# Patient Record
Sex: FEMALE | Race: WHITE | NOT HISPANIC OR LATINO
[De-identification: names, ages, dates, MRNs, and addresses within clinical notes are randomized per-mention and may not be internally consistent; named-entity substitution may affect disease eponyms.]

---
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
## 2019-02-05 ENCOUNTER — APPOINTMENT (OUTPATIENT)
Dept: FAMILY MEDICINE | Facility: CLINIC | Age: 19
End: 2019-02-05

## 2019-06-24 ENCOUNTER — APPOINTMENT (OUTPATIENT)
Dept: FAMILY MEDICINE | Facility: CLINIC | Age: 19
End: 2019-06-24
Payer: COMMERCIAL

## 2019-06-24 VITALS
OXYGEN SATURATION: 96 % | SYSTOLIC BLOOD PRESSURE: 121 MMHG | BODY MASS INDEX: 21.33 KG/M2 | HEART RATE: 68 BPM | HEIGHT: 65 IN | DIASTOLIC BLOOD PRESSURE: 81 MMHG | TEMPERATURE: 97.9 F | WEIGHT: 128 LBS

## 2019-06-24 DIAGNOSIS — H60.509 UNSPECIFIED ACUTE NONINFECTIVE OTITIS EXTERNA, UNSPECIFIED EAR: ICD-10-CM

## 2019-06-24 PROCEDURE — 99213 OFFICE O/P EST LOW 20 MIN: CPT

## 2019-06-24 NOTE — HISTORY OF PRESENT ILLNESS
[FreeTextEntry8] : 20 yo female here for complaint of ear itchiness in both ears. Had been using cutips and experienced a lot of liquidy wax coming out. No ear pain. Started about 2 months ago and is getting worse. No runny nose, itchy eyes, itchy throat. Never had anything like this before. No recent swimming prior. No fever or chills. No cold symptoms.

## 2019-06-24 NOTE — PHYSICAL EXAM
[No Acute Distress] : no acute distress [Normal Sclera/Conjunctiva] : normal sclera/conjunctiva [Normal Outer Ear/Nose] : the outer ears and nose were normal in appearance [Supple] : supple [No Respiratory Distress] : no respiratory distress  [Normal Rate] : normal rate  [Regular Rhythm] : with a regular rhythm [Clear to Auscultation] : lungs were clear to auscultation bilaterally [No Edema] : there was no peripheral edema [No Joint Swelling] : no joint swelling [Normal Gait] : normal gait [Normal Insight/Judgement] : insight and judgment were intact [No Rash] : no rash [de-identified] : otitis externa b/l

## 2020-01-17 ENCOUNTER — APPOINTMENT (OUTPATIENT)
Dept: INTERNAL MEDICINE | Facility: CLINIC | Age: 20
End: 2020-01-17
Payer: COMMERCIAL

## 2020-01-17 VITALS
TEMPERATURE: 98.1 F | OXYGEN SATURATION: 97 % | WEIGHT: 130 LBS | SYSTOLIC BLOOD PRESSURE: 118 MMHG | BODY MASS INDEX: 21.66 KG/M2 | HEIGHT: 65 IN | HEART RATE: 65 BPM | DIASTOLIC BLOOD PRESSURE: 71 MMHG

## 2020-01-17 PROCEDURE — 99213 OFFICE O/P EST LOW 20 MIN: CPT

## 2020-01-17 RX ORDER — CIPROFLOXACIN AND DEXAMETHASONE 3; 1 MG/ML; MG/ML
0.3-0.1 SUSPENSION/ DROPS AURICULAR (OTIC) TWICE DAILY
Qty: 1 | Refills: 0 | Status: DISCONTINUED | COMMUNITY
Start: 2019-06-24 | End: 2020-01-17

## 2020-01-17 RX ORDER — FLUTICASONE PROPIONATE 50 UG/1
50 SPRAY, METERED NASAL TWICE DAILY
Qty: 1 | Refills: 0 | Status: DISCONTINUED | COMMUNITY
Start: 2017-12-20 | End: 2020-01-17

## 2020-01-29 ENCOUNTER — APPOINTMENT (OUTPATIENT)
Dept: OTOLARYNGOLOGY | Facility: CLINIC | Age: 20
End: 2020-01-29
Payer: COMMERCIAL

## 2020-01-29 VITALS
DIASTOLIC BLOOD PRESSURE: 81 MMHG | SYSTOLIC BLOOD PRESSURE: 128 MMHG | HEART RATE: 88 BPM | WEIGHT: 130 LBS | OXYGEN SATURATION: 98 % | HEIGHT: 65 IN | BODY MASS INDEX: 21.66 KG/M2

## 2020-01-29 PROCEDURE — 99203 OFFICE O/P NEW LOW 30 MIN: CPT

## 2020-01-29 NOTE — ASSESSMENT
[FreeTextEntry1] : 19F with persistent left ear itching and drainage, seen and treated with ear drops previously, with recurrence of symptoms; likely otitis externa.  Some evidence of irritatio on left today without overt infection.  I would recommend 7 days of ciprodex bid.  Otitis externa informational sheet given to patient.  She should follow up should symptoms worsen or fail to improve.\par \par Plan:\par - Rx ciprodex\par - water precautions for the ear\par - OE sheet given to patient\par - follow up as needed

## 2020-01-29 NOTE — PHYSICAL EXAM
[Normal] : mucosa is normal [de-identified] : left EAC with mild erythema and edema, no overt infection or purulence

## 2020-01-29 NOTE — REVIEW OF SYSTEMS
[As Noted in HPI] : as noted in HPI [Patient Intake Form Reviewed] : Patient intake form was reviewed [Negative] : Constitutional

## 2020-01-29 NOTE — HISTORY OF PRESENT ILLNESS
[de-identified] : 19F who presents with left ear itching and drainage. Patient reports she has seen her PCP for left ear itching for which she has been treated with ear drops (unsure which type) with some success, but reports recurrence of the symptoms. She denies any hearing changes, otalgia, tinnitus or vertigo.  She does have intermittent otorrhea, once per week.  No recent swimming or water exposure.  She denies any ENT issues otherwise.

## 2020-01-31 RX ORDER — CIPROFLOXACIN AND DEXAMETHASONE 3; 1 MG/ML; MG/ML
0.3-0.1 SUSPENSION/ DROPS AURICULAR (OTIC)
Qty: 1 | Refills: 0 | Status: DISCONTINUED | COMMUNITY
Start: 2020-01-29 | End: 2020-01-31

## 2020-07-09 ENCOUNTER — APPOINTMENT (OUTPATIENT)
Dept: FAMILY MEDICINE | Facility: CLINIC | Age: 20
End: 2020-07-09
Payer: COMMERCIAL

## 2020-07-09 VITALS
BODY MASS INDEX: 21.49 KG/M2 | HEIGHT: 65 IN | OXYGEN SATURATION: 100 % | WEIGHT: 129 LBS | TEMPERATURE: 98.5 F | DIASTOLIC BLOOD PRESSURE: 89 MMHG | SYSTOLIC BLOOD PRESSURE: 124 MMHG | RESPIRATION RATE: 18 BRPM | HEART RATE: 97 BPM

## 2020-07-09 DIAGNOSIS — Z11.59 ENCOUNTER FOR SCREENING FOR OTHER VIRAL DISEASES: ICD-10-CM

## 2020-07-09 DIAGNOSIS — H60.90 UNSPECIFIED OTITIS EXTERNA, UNSPECIFIED EAR: ICD-10-CM

## 2020-07-09 LAB
BASOPHILS # BLD AUTO: 0.04 K/UL
BASOPHILS NFR BLD AUTO: 0.7 %
EOSINOPHIL # BLD AUTO: 0.06 K/UL
EOSINOPHIL NFR BLD AUTO: 1 %
ESTIMATED AVERAGE GLUCOSE: 94 MG/DL
HBA1C MFR BLD HPLC: 4.9 %
HCT VFR BLD CALC: 43.9 %
HGB BLD-MCNC: 14.9 G/DL
IMM GRANULOCYTES NFR BLD AUTO: 0.3 %
LYMPHOCYTES # BLD AUTO: 1.46 K/UL
LYMPHOCYTES NFR BLD AUTO: 24.1 %
MAN DIFF?: NORMAL
MCHC RBC-ENTMCNC: 31.2 PG
MCHC RBC-ENTMCNC: 33.9 GM/DL
MCV RBC AUTO: 91.8 FL
MONOCYTES # BLD AUTO: 0.48 K/UL
MONOCYTES NFR BLD AUTO: 7.9 %
NEUTROPHILS # BLD AUTO: 4 K/UL
NEUTROPHILS NFR BLD AUTO: 66 %
PLATELET # BLD AUTO: 218 K/UL
RBC # BLD: 4.78 M/UL
RBC # FLD: 12.1 %
WBC # FLD AUTO: 6.06 K/UL

## 2020-07-09 PROCEDURE — 36415 COLL VENOUS BLD VENIPUNCTURE: CPT

## 2020-07-09 PROCEDURE — 99395 PREV VISIT EST AGE 18-39: CPT | Mod: 25

## 2020-07-09 RX ORDER — FLUOXETINE HYDROCHLORIDE 20 MG/1
20 CAPSULE ORAL
Refills: 0 | Status: DISCONTINUED | COMMUNITY
Start: 2020-01-17 | End: 2020-07-09

## 2020-07-09 NOTE — HEALTH RISK ASSESSMENT
[Good] : ~his/her~  mood as  good [0] : 2) Feeling down, depressed, or hopeless: Not at all (0) [HIV test declined] : HIV test declined [Hepatitis C test declined] : Hepatitis C test declined [With Significant Other] : lives with significant other [Unemployed] : unemployed [Significant Other] : lives with significant other

## 2020-07-09 NOTE — HISTORY OF PRESENT ILLNESS
[de-identified] : 21 yo female here for CPE.\par \par Still with recurrent ear issues. Ciprodex helps but then symptoms come back.\par \par Has cyst on the back of her head which has grown since she first noticed it a few years ago. At times painful.

## 2020-07-09 NOTE — PHYSICAL EXAM
[No Acute Distress] : no acute distress [Well Nourished] : well nourished [Well Developed] : well developed [Well-Appearing] : well-appearing [Normal Sclera/Conjunctiva] : normal sclera/conjunctiva [PERRL] : pupils equal round and reactive to light [EOMI] : extraocular movements intact [Normal Outer Ear/Nose] : the outer ears and nose were normal in appearance [Normal Oropharynx] : the oropharynx was normal [No JVD] : no jugular venous distention [Supple] : supple [No Lymphadenopathy] : no lymphadenopathy [Thyroid Normal, No Nodules] : the thyroid was normal and there were no nodules present [No Respiratory Distress] : no respiratory distress  [No Accessory Muscle Use] : no accessory muscle use [Clear to Auscultation] : lungs were clear to auscultation bilaterally [Normal Rate] : normal rate  [Regular Rhythm] : with a regular rhythm [No Murmur] : no murmur heard [Normal S1, S2] : normal S1 and S2 [No Carotid Bruits] : no carotid bruits [No Abdominal Bruit] : a ~M bruit was not heard ~T in the abdomen [No Varicosities] : no varicosities [Pedal Pulses Present] : the pedal pulses are present [No Edema] : there was no peripheral edema [No Palpable Aorta] : no palpable aorta [Soft] : abdomen soft [No Extremity Clubbing/Cyanosis] : no extremity clubbing/cyanosis [No Masses] : no abdominal mass palpated [Non Tender] : non-tender [Non-distended] : non-distended [No HSM] : no HSM [Normal Bowel Sounds] : normal bowel sounds [Normal Anterior Cervical Nodes] : no anterior cervical lymphadenopathy [Normal Posterior Cervical Nodes] : no posterior cervical lymphadenopathy [No CVA Tenderness] : no CVA  tenderness [No Joint Swelling] : no joint swelling [No Spinal Tenderness] : no spinal tenderness [Grossly Normal Strength/Tone] : grossly normal strength/tone [No Rash] : no rash [Normal Gait] : normal gait [Coordination Grossly Intact] : coordination grossly intact [No Focal Deficits] : no focal deficits [Normal Affect] : the affect was normal [Normal Insight/Judgement] : insight and judgment were intact [de-identified] : otitis externa [de-identified] : 1 cm encapsulated cyst on right occiput

## 2020-07-13 DIAGNOSIS — R77.0 ABNORMALITY OF ALBUMIN: ICD-10-CM

## 2020-07-13 LAB
25(OH)D3 SERPL-MCNC: 40 NG/ML
ALBUMIN SERPL ELPH-MCNC: 5.3 G/DL
ALP BLD-CCNC: 65 U/L
ALT SERPL-CCNC: 13 U/L
ANION GAP SERPL CALC-SCNC: 15 MMOL/L
APPEARANCE: CLEAR
AST SERPL-CCNC: 19 U/L
BACTERIA: NEGATIVE
BILIRUB SERPL-MCNC: 0.6 MG/DL
BILIRUBIN URINE: NEGATIVE
BLOOD URINE: NEGATIVE
BUN SERPL-MCNC: 11 MG/DL
CALCIUM SERPL-MCNC: 10 MG/DL
CHLORIDE SERPL-SCNC: 100 MMOL/L
CHOLEST SERPL-MCNC: 208 MG/DL
CHOLEST/HDLC SERPL: 2.9 RATIO
CO2 SERPL-SCNC: 25 MMOL/L
COLOR: COLORLESS
CREAT SERPL-MCNC: 0.79 MG/DL
FOLATE SERPL-MCNC: >20 NG/ML
GLUCOSE QUALITATIVE U: NEGATIVE
GLUCOSE SERPL-MCNC: 90 MG/DL
HDLC SERPL-MCNC: 73 MG/DL
HYALINE CASTS: 0 /LPF
KETONES URINE: NEGATIVE
LDLC SERPL CALC-MCNC: 124 MG/DL
LEUKOCYTE ESTERASE URINE: NEGATIVE
MICROSCOPIC-UA: NORMAL
NITRITE URINE: NEGATIVE
PH URINE: 6
POTASSIUM SERPL-SCNC: 4.8 MMOL/L
PROT SERPL-MCNC: 7.7 G/DL
PROTEIN URINE: NEGATIVE
RED BLOOD CELLS URINE: 0 /HPF
SARS-COV-2 IGG SERPL IA-ACNC: 0.08 INDEX
SARS-COV-2 IGG SERPL QL IA: NEGATIVE
SODIUM SERPL-SCNC: 141 MMOL/L
SPECIFIC GRAVITY URINE: 1.01
SQUAMOUS EPITHELIAL CELLS: 2 /HPF
T4 FREE SERPL-MCNC: 1.3 NG/DL
TRIGL SERPL-MCNC: 59 MG/DL
TSH SERPL-ACNC: 1.74 UIU/ML
UROBILINOGEN URINE: NORMAL
VIT B12 SERPL-MCNC: 595 PG/ML
WHITE BLOOD CELLS URINE: 1 /HPF

## 2021-06-29 ENCOUNTER — NON-APPOINTMENT (OUTPATIENT)
Age: 21
End: 2021-06-29

## 2021-07-13 ENCOUNTER — APPOINTMENT (OUTPATIENT)
Dept: FAMILY MEDICINE | Facility: CLINIC | Age: 21
End: 2021-07-13
Payer: COMMERCIAL

## 2021-07-13 VITALS
HEART RATE: 89 BPM | OXYGEN SATURATION: 100 % | HEIGHT: 65 IN | BODY MASS INDEX: 20.99 KG/M2 | DIASTOLIC BLOOD PRESSURE: 89 MMHG | TEMPERATURE: 97.6 F | WEIGHT: 126 LBS | SYSTOLIC BLOOD PRESSURE: 133 MMHG

## 2021-07-13 PROCEDURE — 99213 OFFICE O/P EST LOW 20 MIN: CPT | Mod: 25

## 2021-07-13 PROCEDURE — 36415 COLL VENOUS BLD VENIPUNCTURE: CPT

## 2021-07-13 PROCEDURE — 99395 PREV VISIT EST AGE 18-39: CPT | Mod: 25

## 2021-07-13 PROCEDURE — 99072 ADDL SUPL MATRL&STAF TM PHE: CPT

## 2021-07-13 NOTE — HEALTH RISK ASSESSMENT
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

## 2021-07-13 NOTE — PHYSICAL EXAM
[Well Nourished] : well nourished [Well Developed] : well developed [Well-Appearing] : well-appearing [Normal Sclera/Conjunctiva] : normal sclera/conjunctiva [PERRL] : pupils equal round and reactive to light [EOMI] : extraocular movements intact [Normal Outer Ear/Nose] : the outer ears and nose were normal in appearance [Normal Oropharynx] : the oropharynx was normal [Normal TMs] : both tympanic membranes were normal [No JVD] : no jugular venous distention [No Lymphadenopathy] : no lymphadenopathy [Supple] : supple [Thyroid Normal, No Nodules] : the thyroid was normal and there were no nodules present [No Respiratory Distress] : no respiratory distress  [No Accessory Muscle Use] : no accessory muscle use [Clear to Auscultation] : lungs were clear to auscultation bilaterally [Normal Rate] : normal rate  [Regular Rhythm] : with a regular rhythm [Normal S1, S2] : normal S1 and S2 [No Murmur] : no murmur heard [No Carotid Bruits] : no carotid bruits [No Abdominal Bruit] : a ~M bruit was not heard ~T in the abdomen [No Varicosities] : no varicosities [Pedal Pulses Present] : the pedal pulses are present [No Edema] : there was no peripheral edema [No Palpable Aorta] : no palpable aorta [No Extremity Clubbing/Cyanosis] : no extremity clubbing/cyanosis [Soft] : abdomen soft [Non Tender] : non-tender [Non-distended] : non-distended [No Masses] : no abdominal mass palpated [No HSM] : no HSM [Normal Bowel Sounds] : normal bowel sounds [Normal Posterior Cervical Nodes] : no posterior cervical lymphadenopathy [Normal Anterior Cervical Nodes] : no anterior cervical lymphadenopathy [No CVA Tenderness] : no CVA  tenderness [No Spinal Tenderness] : no spinal tenderness [No Joint Swelling] : no joint swelling [Grossly Normal Strength/Tone] : grossly normal strength/tone [No Rash] : no rash [Coordination Grossly Intact] : coordination grossly intact [No Focal Deficits] : no focal deficits [Normal Gait] : normal gait [Normal Affect] : the affect was normal [Normal Insight/Judgement] : insight and judgment were intact [de-identified] : 1 cm encapsulated cyst on right occiput

## 2021-07-13 NOTE — HISTORY OF PRESENT ILLNESS
[de-identified] : 22 yo female here for CPE. Feeling well today. \par \par Has cyst on the back of her head which has grown since she first noticed it a few years ago. At times painful. Would like to have it removed.\par \par H/o depression in the past. Was on fluoxetine in the past, unsure if helped. Looking for therapist now. Mild symptoms currently.\par \par Patient is current smoker, 3-4 cigarettes daily now and cutting down on her own. Smoking for the last 4 years, max 1/2 ppd. No SOB or CP.

## 2021-07-14 LAB
25(OH)D3 SERPL-MCNC: 26 NG/ML
ALBUMIN SERPL ELPH-MCNC: 4.9 G/DL
ALP BLD-CCNC: 63 U/L
ALT SERPL-CCNC: 13 U/L
ANION GAP SERPL CALC-SCNC: 13 MMOL/L
APPEARANCE: CLEAR
AST SERPL-CCNC: 17 U/L
BACTERIA: ABNORMAL
BASOPHILS # BLD AUTO: 0.04 K/UL
BASOPHILS NFR BLD AUTO: 0.9 %
BILIRUB SERPL-MCNC: 0.5 MG/DL
BILIRUBIN URINE: NEGATIVE
BLOOD URINE: NEGATIVE
BUN SERPL-MCNC: 10 MG/DL
C TRACH RRNA SPEC QL NAA+PROBE: NOT DETECTED
CALCIUM SERPL-MCNC: 10.5 MG/DL
CHLORIDE SERPL-SCNC: 100 MMOL/L
CHOLEST SERPL-MCNC: 204 MG/DL
CO2 SERPL-SCNC: 26 MMOL/L
COLOR: NORMAL
CREAT SERPL-MCNC: 0.82 MG/DL
EOSINOPHIL # BLD AUTO: 0.07 K/UL
EOSINOPHIL NFR BLD AUTO: 1.5 %
ESTIMATED AVERAGE GLUCOSE: 103 MG/DL
FOLATE SERPL-MCNC: 12.3 NG/ML
GLUCOSE QUALITATIVE U: NEGATIVE
GLUCOSE SERPL-MCNC: 81 MG/DL
HAV IGM SER QL: NONREACTIVE
HBA1C MFR BLD HPLC: 5.2 %
HBV CORE IGM SER QL: NONREACTIVE
HBV SURFACE AG SER QL: NONREACTIVE
HCT VFR BLD CALC: 42.4 %
HCV AB SER QL: NONREACTIVE
HCV S/CO RATIO: 0.23 S/CO
HDLC SERPL-MCNC: 90 MG/DL
HGB BLD-MCNC: 14.5 G/DL
HIV1+2 AB SPEC QL IA.RAPID: NONREACTIVE
HYALINE CASTS: 6 /LPF
IMM GRANULOCYTES NFR BLD AUTO: 0.2 %
KETONES URINE: NEGATIVE
LDLC SERPL CALC-MCNC: 102 MG/DL
LEUKOCYTE ESTERASE URINE: ABNORMAL
LYMPHOCYTES # BLD AUTO: 1.31 K/UL
LYMPHOCYTES NFR BLD AUTO: 28.5 %
MAN DIFF?: NORMAL
MCHC RBC-ENTMCNC: 33.2 PG
MCHC RBC-ENTMCNC: 34.2 GM/DL
MCV RBC AUTO: 97 FL
MICROSCOPIC-UA: NORMAL
MONOCYTES # BLD AUTO: 0.35 K/UL
MONOCYTES NFR BLD AUTO: 7.6 %
N GONORRHOEA RRNA SPEC QL NAA+PROBE: NOT DETECTED
NEUTROPHILS # BLD AUTO: 2.82 K/UL
NEUTROPHILS NFR BLD AUTO: 61.3 %
NITRITE URINE: NEGATIVE
NONHDLC SERPL-MCNC: 114 MG/DL
PH URINE: 6.5
PLATELET # BLD AUTO: 198 K/UL
POTASSIUM SERPL-SCNC: 4.7 MMOL/L
PROT SERPL-MCNC: 7.1 G/DL
PROTEIN URINE: NEGATIVE
RBC # BLD: 4.37 M/UL
RBC # FLD: 12.8 %
RED BLOOD CELLS URINE: 1 /HPF
SODIUM SERPL-SCNC: 138 MMOL/L
SOURCE AMPLIFICATION: NORMAL
SPECIFIC GRAVITY URINE: 1.01
SQUAMOUS EPITHELIAL CELLS: 13 /HPF
T PALLIDUM AB SER QL IA: NEGATIVE
T4 FREE SERPL-MCNC: 1.2 NG/DL
TRIGL SERPL-MCNC: 59 MG/DL
TSH SERPL-ACNC: 1.64 UIU/ML
UROBILINOGEN URINE: NORMAL
VIT B12 SERPL-MCNC: 443 PG/ML
WBC # FLD AUTO: 4.6 K/UL
WHITE BLOOD CELLS URINE: 4 /HPF

## 2021-08-16 ENCOUNTER — APPOINTMENT (OUTPATIENT)
Dept: SURGERY | Facility: CLINIC | Age: 21
End: 2021-08-16
Payer: COMMERCIAL

## 2021-08-16 VITALS
SYSTOLIC BLOOD PRESSURE: 117 MMHG | HEART RATE: 58 BPM | TEMPERATURE: 97.6 F | WEIGHT: 125 LBS | DIASTOLIC BLOOD PRESSURE: 75 MMHG | OXYGEN SATURATION: 97 %

## 2021-08-16 PROCEDURE — 99203 OFFICE O/P NEW LOW 30 MIN: CPT

## 2021-08-16 NOTE — CONSULT LETTER
[FreeTextEntry1] : 2021\par \par \par \par Manisha Og D.O.\par [facility]\par [address] [place] [address]\[place] [address]\par Telephone #: [phone]\par \par \par Re: Karla Vaughn\par : 2000\par \par \par Dear Dr. Og:\par \par I had the opportunity to see Ms. Vaugnh today for evaluation and management of a mass of the right posterior scalp.  She stated the mass has been present for approximately 1 year.  She noticed the mass herself.  She denied significant pain of the mass, although it is sensitive to pressure.  She stated the mass is enlarging.\par \par On physical examination, her weight is 125 pounds.  Her temperature is 97.6 °F, blood pressure is 117/75, heart rate is 58, and O2 saturation is 97% on room air.  In general, she is a well-dressed, well-nourished woman who appears her stated age and is in no acute distress.  She is calm, alert and oriented x 3.  HEENT exam demonstrates a normocephalic atraumatic appearance with no scleral icterus.  Her extremities are warm and dry without clubbing, cyanosis or edema.  Her posterior right scalp has an approximately 1 cm soft, mobile, non-tender mass.  There is an additional mass of the anterior scalp that is under 1 cm in diameter. \par \par In summary, Ms. Vaughn is a 21-year-old woman with scalp masses.  We will plan for excision of the mass(es) at the patient's convenience.\par \par Thank you for the opportunity to care for this patient.  Please do not hesitate to contact me in the event that you have any questions or concerns regarding the care of this patient. \par \par Sincerely,\par \par \par \par \par Mary Vega M.D.

## 2021-08-16 NOTE — PHYSICAL EXAM
[Calm] : calm [de-identified] : NAD, comfortable [de-identified] : NCAT, no scleral icterus [de-identified] : No clubbing, cyanosis, or edema. [de-identified] : Warm, dry.  Her posterior right scalp has an approximately 1 cm soft, mobile, non-tender mass.  There is an additional mass of the anterior scalp that is under 1 cm in diameter. [de-identified] : A&Ox3

## 2021-08-16 NOTE — HISTORY OF PRESENT ILLNESS
[de-identified] : Ms. Vaughn presented today for evaluation and management of a mass of the right posterior scalp.  She stated the mass has been present for approximately 1 year.  She noticed the mass herself.  She denied significant pain of the mass, although it is sensitive to pressure.  She stated the mass is enlarging.

## 2021-08-16 NOTE — ASSESSMENT
[FreeTextEntry1] : Ms. Vaughn is a 21-year-old woman with scalp masses.  We will plan for excision of the mass(es) at the patient's convenience.

## 2021-11-18 ENCOUNTER — TRANSCRIPTION ENCOUNTER (OUTPATIENT)
Age: 21
End: 2021-11-18

## 2021-11-24 ENCOUNTER — APPOINTMENT (OUTPATIENT)
Dept: FAMILY MEDICINE | Facility: CLINIC | Age: 21
End: 2021-11-24
Payer: COMMERCIAL

## 2021-11-24 ENCOUNTER — NON-APPOINTMENT (OUTPATIENT)
Age: 21
End: 2021-11-24

## 2021-11-24 PROCEDURE — 99213 OFFICE O/P EST LOW 20 MIN: CPT | Mod: 95

## 2021-11-24 RX ORDER — NEOMYCIN SULFATE, POLYMYXIN B SULFATE AND HYDROCORTISONE 3.5; 10000; 1 MG/ML; [IU]/ML; MG/ML
3.5-10000-1 SOLUTION AURICULAR (OTIC) 4 TIMES DAILY
Qty: 1 | Refills: 0 | Status: DISCONTINUED | COMMUNITY
Start: 2020-07-09 | End: 2021-11-24

## 2021-11-24 NOTE — HISTORY OF PRESENT ILLNESS
[Home] : at home, [unfilled] , at the time of the visit. [Other Location: e.g. Home (Enter Location, City,State)___] : at [unfilled] [Verbal consent obtained from patient] : the patient, [unfilled] [de-identified] : 22 yo female here for acute visit. \par \par C/o itchy and watery ears. Noticed some drainage, right side is worse. No cutip usage. Started about a month ago. Believes that she is scratching it in her sleep. Admitting to feeling like her ears are clogged. Has had this issue 3 times in the last year and a half. Occurs in the winter usually. No swimming recently. \par \par H/o cyst on the head. Would like to have it removed. Saw surgeon but needs to follow up to schedule excision.

## 2021-11-29 ENCOUNTER — NON-APPOINTMENT (OUTPATIENT)
Age: 21
End: 2021-11-29

## 2022-01-23 ENCOUNTER — NON-APPOINTMENT (OUTPATIENT)
Age: 22
End: 2022-01-23

## 2022-02-01 ENCOUNTER — APPOINTMENT (OUTPATIENT)
Dept: FAMILY MEDICINE | Facility: CLINIC | Age: 22
End: 2022-02-01
Payer: COMMERCIAL

## 2022-02-01 VITALS
OXYGEN SATURATION: 97 % | TEMPERATURE: 97.3 F | HEIGHT: 65 IN | BODY MASS INDEX: 21.66 KG/M2 | HEART RATE: 70 BPM | SYSTOLIC BLOOD PRESSURE: 121 MMHG | WEIGHT: 130 LBS | DIASTOLIC BLOOD PRESSURE: 77 MMHG

## 2022-02-01 DIAGNOSIS — N64.4 MASTODYNIA: ICD-10-CM

## 2022-02-01 PROCEDURE — 99214 OFFICE O/P EST MOD 30 MIN: CPT

## 2022-02-01 NOTE — PHYSICAL EXAM
[No Acute Distress] : no acute distress [Normal Sclera/Conjunctiva] : normal sclera/conjunctiva [Normal Outer Ear/Nose] : the outer ears and nose were normal in appearance [Supple] : supple [No Respiratory Distress] : no respiratory distress  [No Accessory Muscle Use] : no accessory muscle use [Clear to Auscultation] : lungs were clear to auscultation bilaterally [Normal Rate] : normal rate  [Regular Rhythm] : with a regular rhythm [Normal S1, S2] : normal S1 and S2 [No Edema] : there was no peripheral edema [Normal Appearance] : normal in appearance [No Nipple Discharge] : no nipple discharge [No Axillary Lymphadenopathy] : no axillary lymphadenopathy [No Joint Swelling] : no joint swelling [No Rash] : no rash [Coordination Grossly Intact] : coordination grossly intact [Normal Insight/Judgement] : insight and judgment were intact [de-identified] : right breast with possible cyst at 10 o clock

## 2022-02-01 NOTE — HISTORY OF PRESENT ILLNESS
[de-identified] : 20 yo female here for acute visit. \par \par H/o itchy and watery ears. Noticed some drainage in November, right side was worse. No cutip usage. Has had this issue 3 times in the last year and a half. Occurs in the winter usually. No swimming recently. Responded to topical antibiotics, no longer with issue. \par \par H/o cyst on the head. Would like to have it removed. Saw surgeon but needs to follow up to schedule excision. Reports that cyst is getting bigger, but not painful.\par \par Reports tenderness in the breasts and they may have gotten larger recently. Has had IUD in place for the last 5 years. Will follow up with GYN. Has been having periods again recently. No FH of breast cancer. On her period today. Seeing GYN today at 10:15. No nipple changes or discharge.

## 2022-02-17 ENCOUNTER — NON-APPOINTMENT (OUTPATIENT)
Age: 22
End: 2022-02-17

## 2022-02-18 ENCOUNTER — OUTPATIENT (OUTPATIENT)
Dept: OUTPATIENT SERVICES | Facility: HOSPITAL | Age: 22
LOS: 1 days | End: 2022-02-18

## 2022-02-18 ENCOUNTER — APPOINTMENT (OUTPATIENT)
Dept: ULTRASOUND IMAGING | Facility: CLINIC | Age: 22
End: 2022-02-18
Payer: COMMERCIAL

## 2022-02-18 ENCOUNTER — RESULT REVIEW (OUTPATIENT)
Age: 22
End: 2022-02-18

## 2022-02-18 PROCEDURE — 76641 ULTRASOUND BREAST COMPLETE: CPT | Mod: 26,50

## 2022-02-22 ENCOUNTER — APPOINTMENT (OUTPATIENT)
Dept: FAMILY MEDICINE | Facility: CLINIC | Age: 22
End: 2022-02-22

## 2022-03-31 ENCOUNTER — NON-APPOINTMENT (OUTPATIENT)
Age: 22
End: 2022-03-31

## 2022-04-01 ENCOUNTER — TRANSCRIPTION ENCOUNTER (OUTPATIENT)
Age: 22
End: 2022-04-01

## 2022-04-11 PROBLEM — Z11.59 SCREENING FOR VIRAL DISEASE: Status: ACTIVE | Noted: 2020-07-09

## 2022-11-22 ENCOUNTER — APPOINTMENT (OUTPATIENT)
Dept: SURGERY | Facility: CLINIC | Age: 22
End: 2022-11-22

## 2022-11-22 VITALS
TEMPERATURE: 97.4 F | WEIGHT: 134 LBS | HEIGHT: 65 IN | SYSTOLIC BLOOD PRESSURE: 136 MMHG | BODY MASS INDEX: 22.33 KG/M2 | DIASTOLIC BLOOD PRESSURE: 92 MMHG | HEART RATE: 81 BPM | OXYGEN SATURATION: 98 % | RESPIRATION RATE: 17 BRPM

## 2022-11-22 PROCEDURE — 99212 OFFICE O/P EST SF 10 MIN: CPT

## 2022-11-22 NOTE — HISTORY OF PRESENT ILLNESS
[de-identified] : Ms. Vaughn presented previously for evaluation and management of a mass of the right posterior scalp.  She stated the mass has been present for approximately 1 year.  She noticed the mass herself.  She denied significant pain of the mass, although it is sensitive to pressure.  She stated the mass is enlarging. [de-identified] : She presented today for follow up and to discuss scheduling surgery.  She reported the right posterior scalp mass has increased in size since her last visit.  She also noted a mass on the left anterior scalp near the top of her head, which has appeared since her prior visit.

## 2022-11-22 NOTE — ASSESSMENT
[FreeTextEntry1] : Ms. Vaughn is a 22-year-old woman with scalp masses.  We will plan for excision of the scalp masses on January 26, 2023.

## 2022-11-22 NOTE — PHYSICAL EXAM
[Calm] : calm [de-identified] : NAD, comfortable [de-identified] : NCAT, no scleral icterus [de-identified] : No clubbing, cyanosis, or edema. [de-identified] : Warm, dry.  Her posterior right scalp has an approximately 1 cm soft, mobile, non-tender mass.  There is an additional mass of the anterior scalp that is under 1 cm in diameter.  Her left anterior scalp has an approximately 0.5 cm soft, mobile, non-tender mass. [de-identified] : A&Ox3

## 2022-12-01 DIAGNOSIS — Z12.83 ENCOUNTER FOR SCREENING FOR MALIGNANT NEOPLASM OF SKIN: ICD-10-CM

## 2022-12-02 ENCOUNTER — TRANSCRIPTION ENCOUNTER (OUTPATIENT)
Age: 22
End: 2022-12-02

## 2022-12-13 ENCOUNTER — NON-APPOINTMENT (OUTPATIENT)
Age: 22
End: 2022-12-13

## 2023-01-03 ENCOUNTER — NON-APPOINTMENT (OUTPATIENT)
Age: 23
End: 2023-01-03

## 2023-01-03 ENCOUNTER — APPOINTMENT (OUTPATIENT)
Dept: FAMILY MEDICINE | Facility: CLINIC | Age: 23
End: 2023-01-03
Payer: COMMERCIAL

## 2023-01-03 VITALS
DIASTOLIC BLOOD PRESSURE: 80 MMHG | OXYGEN SATURATION: 96 % | HEART RATE: 88 BPM | WEIGHT: 134 LBS | HEIGHT: 65 IN | SYSTOLIC BLOOD PRESSURE: 119 MMHG | BODY MASS INDEX: 22.33 KG/M2 | TEMPERATURE: 96.8 F

## 2023-01-03 DIAGNOSIS — Z01.818 ENCOUNTER FOR OTHER PREPROCEDURAL EXAMINATION: ICD-10-CM

## 2023-01-03 DIAGNOSIS — R82.90 UNSPECIFIED ABNORMAL FINDINGS IN URINE: ICD-10-CM

## 2023-01-03 DIAGNOSIS — L25.9 UNSPECIFIED CONTACT DERMATITIS, UNSPECIFIED CAUSE: ICD-10-CM

## 2023-01-03 DIAGNOSIS — F32.A ANXIETY DISORDER, UNSPECIFIED: ICD-10-CM

## 2023-01-03 DIAGNOSIS — F41.9 ANXIETY DISORDER, UNSPECIFIED: ICD-10-CM

## 2023-01-03 PROCEDURE — 36415 COLL VENOUS BLD VENIPUNCTURE: CPT

## 2023-01-03 PROCEDURE — 99395 PREV VISIT EST AGE 18-39: CPT | Mod: 25

## 2023-01-03 PROCEDURE — 99214 OFFICE O/P EST MOD 30 MIN: CPT | Mod: 25

## 2023-01-03 PROCEDURE — 93000 ELECTROCARDIOGRAM COMPLETE: CPT

## 2023-01-03 RX ORDER — CLOBETASOL PROPIONATE 0.5 MG/G
0.05 CREAM TOPICAL
Qty: 15 | Refills: 0 | Status: ACTIVE | COMMUNITY
Start: 2022-12-01

## 2023-01-03 RX ORDER — DEXAMETHASONE SODIUM PHOSPHATE 1 MG/ML
0.1 SOLUTION/ DROPS OPHTHALMIC TWICE DAILY
Qty: 2 | Refills: 1 | Status: DISCONTINUED | COMMUNITY
Start: 2020-01-31 | End: 2023-01-03

## 2023-01-04 PROBLEM — R82.90 ABNORMAL URINE: Status: ACTIVE | Noted: 2023-01-04

## 2023-01-04 LAB
25(OH)D3 SERPL-MCNC: 20.2 NG/ML
ALBUMIN SERPL ELPH-MCNC: 5.3 G/DL
ALP BLD-CCNC: 71 U/L
ALT SERPL-CCNC: 11 U/L
ANION GAP SERPL CALC-SCNC: 12 MMOL/L
APPEARANCE: ABNORMAL
APTT BLD: 28.7 SEC
AST SERPL-CCNC: 18 U/L
BACTERIA: ABNORMAL
BASOPHILS # BLD AUTO: 0.04 K/UL
BASOPHILS NFR BLD AUTO: 0.6 %
BILIRUB SERPL-MCNC: 0.6 MG/DL
BILIRUBIN URINE: NEGATIVE
BLOOD URINE: NEGATIVE
BUN SERPL-MCNC: 10 MG/DL
C TRACH RRNA SPEC QL NAA+PROBE: NOT DETECTED
CALCIUM SERPL-MCNC: 10.2 MG/DL
CHLORIDE SERPL-SCNC: 101 MMOL/L
CHOLEST SERPL-MCNC: 212 MG/DL
CO2 SERPL-SCNC: 28 MMOL/L
COLOR: NORMAL
CREAT SERPL-MCNC: 0.78 MG/DL
EGFR: 109 ML/MIN/1.73M2
EOSINOPHIL # BLD AUTO: 0.16 K/UL
EOSINOPHIL NFR BLD AUTO: 2.3 %
ESTIMATED AVERAGE GLUCOSE: 108 MG/DL
FOLATE SERPL-MCNC: 11.8 NG/ML
GLUCOSE QUALITATIVE U: NEGATIVE
GLUCOSE SERPL-MCNC: 78 MG/DL
HAV IGM SER QL: NONREACTIVE
HBA1C MFR BLD HPLC: 5.4 %
HBV CORE IGM SER QL: NONREACTIVE
HBV SURFACE AG SER QL: NONREACTIVE
HCG SERPL QL: NEGATIVE
HCT VFR BLD CALC: 41.9 %
HCV AB SER QL: NONREACTIVE
HCV S/CO RATIO: 0.15 S/CO
HDLC SERPL-MCNC: 72 MG/DL
HGB BLD-MCNC: 14.5 G/DL
HIV1+2 AB SPEC QL IA.RAPID: NONREACTIVE
HYALINE CASTS: 8 /LPF
IMM GRANULOCYTES NFR BLD AUTO: 0.1 %
INR PPP: 0.93 RATIO
KETONES URINE: NEGATIVE
LDLC SERPL CALC-MCNC: 126 MG/DL
LEUKOCYTE ESTERASE URINE: ABNORMAL
LYMPHOCYTES # BLD AUTO: 1.49 K/UL
LYMPHOCYTES NFR BLD AUTO: 21.1 %
MAN DIFF?: NORMAL
MCHC RBC-ENTMCNC: 32.4 PG
MCHC RBC-ENTMCNC: 34.6 GM/DL
MCV RBC AUTO: 93.5 FL
MICROSCOPIC-UA: NORMAL
MONOCYTES # BLD AUTO: 0.39 K/UL
MONOCYTES NFR BLD AUTO: 5.5 %
N GONORRHOEA RRNA SPEC QL NAA+PROBE: NOT DETECTED
NEUTROPHILS # BLD AUTO: 4.97 K/UL
NEUTROPHILS NFR BLD AUTO: 70.4 %
NITRITE URINE: NEGATIVE
NONHDLC SERPL-MCNC: 140 MG/DL
PAPP-A SERPL-ACNC: <1 MIU/ML
PH URINE: 6.5
PLATELET # BLD AUTO: 249 K/UL
POTASSIUM SERPL-SCNC: 4.3 MMOL/L
PROT SERPL-MCNC: 7.8 G/DL
PROTEIN URINE: NEGATIVE
PT BLD: 11 SEC
RBC # BLD: 4.48 M/UL
RBC # FLD: 12.8 %
RED BLOOD CELLS URINE: 1 /HPF
SODIUM SERPL-SCNC: 140 MMOL/L
SOURCE AMPLIFICATION: NORMAL
SPECIFIC GRAVITY URINE: 1.01
SQUAMOUS EPITHELIAL CELLS: 8 /HPF
T PALLIDUM AB SER QL IA: NEGATIVE
T4 FREE SERPL-MCNC: 1.2 NG/DL
TRIGL SERPL-MCNC: 71 MG/DL
TSH SERPL-ACNC: 1.2 UIU/ML
UROBILINOGEN URINE: NORMAL
VIT B12 SERPL-MCNC: 528 PG/ML
WBC # FLD AUTO: 7.06 K/UL
WHITE BLOOD CELLS URINE: 6 /HPF

## 2023-01-04 NOTE — ASSESSMENT
[High Risk Surgery - Intraperitoneal, Intrathoracic or Supringuinal Vascular Procedures] : High Risk Surgery - Intraperitoneal, Intrathoracic or Supringuinal Vascular Procedures - No (0) [Ischemic Heart Disease] : Ischemic Heart Disease - No (0) [Congestive Heart Failure] : Congestive Heart Failure - No (0) [Prior Cerebrovascular Accident or TIA] : Prior Cerebrovascular Accident or TIA - No (0) [Creatinine >= 2mg/dL (1 Point)] : Creatinine >= 2mg/dL - No (0) [Insulin-dependent Diabetic (1 Point)] : Insulin-dependent Diabetic - No (0) [0] : 0 , RCRI Class: I, Risk of Post-Op Cardiac Complications: 3.9%, 95% CI for Risk Estimate: 2.8% - 5.4% [Patient Optimized for Surgery] : Patient optimized for surgery [FreeTextEntry4] : 21 yo female here for preop for cyst excision and CPE. Feeling well today. Follow up labs. EKG NSR. Patient low risk for low risk surgery. PT medically optimized at this time.

## 2023-01-04 NOTE — HISTORY OF PRESENT ILLNESS
[No Pertinent Cardiac History] : no history of aortic stenosis, atrial fibrillation, coronary artery disease, recent myocardial infarction, or implantable device/pacemaker [Smoker] : smoker [No Adverse Anesthesia Reaction] : no adverse anesthesia reaction in self or family member [(Patient denies any chest pain, claudication, dyspnea on exertion, orthopnea, palpitations or syncope)] : Patient denies any chest pain, claudication, dyspnea on exertion, orthopnea, palpitations or syncope [Chronic Anticoagulation] : no chronic anticoagulation [Chronic Kidney Disease] : no chronic kidney disease [Diabetes] : no diabetes [FreeTextEntry1] : Cyst excision [FreeTextEntry2] : 1/26/22 [FreeTextEntry3] : Dr Vega [FreeTextEntry4] : 21 yo female here for preop for cyst excision and CPE. Has cyst on the back of her head which has grown since she first noticed it a few years ago. At times painful. \par \par H/o depression and working with therapist. Mild symptoms currently.\par \par Patient is current smoker, 2-3 cigarettes daily now and cutting down on her own. \par \par Reports recent contact Dermitis from resin. Saw derm, prescribed topical steroids. Improved, but not totally gone.

## 2023-01-04 NOTE — PHYSICAL EXAM
[No Acute Distress] : no acute distress [Well Nourished] : well nourished [Well Developed] : well developed [Well-Appearing] : well-appearing [Normal Sclera/Conjunctiva] : normal sclera/conjunctiva [PERRL] : pupils equal round and reactive to light [EOMI] : extraocular movements intact [Normal Outer Ear/Nose] : the outer ears and nose were normal in appearance [Normal Oropharynx] : the oropharynx was normal [No JVD] : no jugular venous distention [No Lymphadenopathy] : no lymphadenopathy [Supple] : supple [Thyroid Normal, No Nodules] : the thyroid was normal and there were no nodules present [No Respiratory Distress] : no respiratory distress  [No Accessory Muscle Use] : no accessory muscle use [Clear to Auscultation] : lungs were clear to auscultation bilaterally [Normal Rate] : normal rate  [Regular Rhythm] : with a regular rhythm [Normal S1, S2] : normal S1 and S2 [No Murmur] : no murmur heard [No Abdominal Bruit] : a ~M bruit was not heard ~T in the abdomen [No Varicosities] : no varicosities [No Edema] : there was no peripheral edema [No Palpable Aorta] : no palpable aorta [No Extremity Clubbing/Cyanosis] : no extremity clubbing/cyanosis [Soft] : abdomen soft [Non Tender] : non-tender [Non-distended] : non-distended [No Masses] : no abdominal mass palpated [No HSM] : no HSM [Normal Bowel Sounds] : normal bowel sounds [Normal Posterior Cervical Nodes] : no posterior cervical lymphadenopathy [Normal Anterior Cervical Nodes] : no anterior cervical lymphadenopathy [No CVA Tenderness] : no CVA  tenderness [No Spinal Tenderness] : no spinal tenderness [No Joint Swelling] : no joint swelling [Grossly Normal Strength/Tone] : grossly normal strength/tone [Coordination Grossly Intact] : coordination grossly intact [No Focal Deficits] : no focal deficits [Normal Gait] : normal gait [Normal Affect] : the affect was normal [Normal Insight/Judgement] : insight and judgment were intact [de-identified] : mild residual dermitis on right forearm

## 2023-01-05 ENCOUNTER — NON-APPOINTMENT (OUTPATIENT)
Age: 23
End: 2023-01-05

## 2023-01-09 ENCOUNTER — NON-APPOINTMENT (OUTPATIENT)
Age: 23
End: 2023-01-09

## 2023-01-10 ENCOUNTER — TRANSCRIPTION ENCOUNTER (OUTPATIENT)
Age: 23
End: 2023-01-10

## 2023-01-25 ENCOUNTER — TRANSCRIPTION ENCOUNTER (OUTPATIENT)
Age: 23
End: 2023-01-25

## 2023-01-25 NOTE — ASU PATIENT PROFILE, ADULT - NS PREOP UNDERSTANDS INFO
No solid food/dairy/candy/gum after midnight; water before 10:00am; Pt to bring photo ID/insurance card; no jewelries/contact lens/valuables; no smoking/alcohol drinking/recreational drug use; Escort must have photo ID. Address and call back number was given./yes

## 2023-01-25 NOTE — ASU PATIENT PROFILE, ADULT - FALL HARM RISK - UNIVERSAL INTERVENTIONS
Bed in lowest position, wheels locked, appropriate side rails in place/Call bell, personal items and telephone in reach/Instruct patient to call for assistance before getting out of bed or chair/Non-slip footwear when patient is out of bed/[place] to call system/Physically safe environment - no spills, clutter or unnecessary equipment/Purposeful Proactive Rounding/Room/bathroom lighting operational, light cord in reach

## 2023-01-26 ENCOUNTER — TRANSCRIPTION ENCOUNTER (OUTPATIENT)
Age: 23
End: 2023-01-26

## 2023-01-26 ENCOUNTER — RESULT REVIEW (OUTPATIENT)
Age: 23
End: 2023-01-26

## 2023-01-26 ENCOUNTER — APPOINTMENT (OUTPATIENT)
Dept: SURGERY | Facility: AMBULATORY SURGERY CENTER | Age: 23
End: 2023-01-26

## 2023-01-26 ENCOUNTER — NON-APPOINTMENT (OUTPATIENT)
Age: 23
End: 2023-01-26

## 2023-01-26 ENCOUNTER — OUTPATIENT (OUTPATIENT)
Dept: OUTPATIENT SERVICES | Facility: HOSPITAL | Age: 23
LOS: 1 days | Discharge: HOME | End: 2023-01-26
Payer: COMMERCIAL

## 2023-01-26 VITALS
RESPIRATION RATE: 13 BRPM | OXYGEN SATURATION: 99 % | SYSTOLIC BLOOD PRESSURE: 111 MMHG | HEART RATE: 70 BPM | DIASTOLIC BLOOD PRESSURE: 60 MMHG

## 2023-01-26 VITALS
DIASTOLIC BLOOD PRESSURE: 77 MMHG | TEMPERATURE: 99 F | WEIGHT: 132.5 LBS | RESPIRATION RATE: 16 BRPM | HEIGHT: 65 IN | HEART RATE: 71 BPM | OXYGEN SATURATION: 97 % | SYSTOLIC BLOOD PRESSURE: 117 MMHG

## 2023-01-26 DIAGNOSIS — Z98.890 OTHER SPECIFIED POSTPROCEDURAL STATES: Chronic | ICD-10-CM

## 2023-01-26 LAB — SARS-COV-2 N GENE NPH QL NAA+PROBE: NOT DETECTED

## 2023-01-26 PROCEDURE — 21011 EXC FACE LES SC <2 CM: CPT

## 2023-01-26 PROCEDURE — 88304 TISSUE EXAM BY PATHOLOGIST: CPT | Mod: 26

## 2023-01-26 RX ORDER — OXYCODONE HYDROCHLORIDE 5 MG/1
1 TABLET ORAL
Qty: 3 | Refills: 0
Start: 2023-01-26

## 2023-01-26 RX ORDER — ACETAMINOPHEN 500 MG
650 TABLET ORAL ONCE
Refills: 0 | Status: DISCONTINUED | OUTPATIENT
Start: 2023-01-26 | End: 2023-01-26

## 2023-01-26 RX ORDER — OXYCODONE HYDROCHLORIDE 5 MG/1
5 TABLET ORAL ONCE
Refills: 0 | Status: DISCONTINUED | OUTPATIENT
Start: 2023-01-26 | End: 2023-01-26

## 2023-01-26 RX ORDER — SODIUM CHLORIDE 9 MG/ML
1000 INJECTION, SOLUTION INTRAVENOUS
Refills: 0 | Status: DISCONTINUED | OUTPATIENT
Start: 2023-01-26 | End: 2023-01-26

## 2023-01-26 NOTE — ASU DISCHARGE PLAN (ADULT/PEDIATRIC) - CARE PROVIDER_API CALL
Mary Vega)  Surgery  [address]  [place], [place] [address]  Phone: [phone]  Fax: [phone]  Follow Up Time:

## 2023-01-26 NOTE — ASU DISCHARGE PLAN (ADULT/PEDIATRIC) - NS MD DC FALL RISK RISK
For information on Fall & Injury Prevention, visit: [email or web address] OR  [email or web address] OR  https://www.cdc.gov/steadi/patient.html

## 2023-01-26 NOTE — BRIEF OPERATIVE NOTE - OPERATION/FINDINGS
Pt placed in left lateral decub position. Prepped and draped in sterile fashion. Small 2cm subcutaneous mass palpated right of occiput. 2cm transverse incision made. Subcutaneous tissue dissected with electrocautery. Mass nonadherent and expressed with manual pressure. Hemostasis achieved with electrocautery. Skin closed with 3.0 vicryl deep dermals and dermabond.

## 2023-01-26 NOTE — ASU PREOP CHECKLIST - AS BP NONINV METHOD
"Subjective:       Patient ID: Forest Wong is a 21 y.o. female.    Vitals:  height is 5' 3" (1.6 m) and weight is 56.7 kg (125 lb). Her oral temperature is 98.3 °F (36.8 °C). Her blood pressure is 104/69 and her pulse is 113 (abnormal). Her respiration is 18 and oxygen saturation is 98%.     Chief Complaint: Emesis     21-year-old female presents today with complaints of mild cough, sore throat, nausea, vomiting, diarrhea and abdominal cramping for the past 3 days.  Patient reports that she had 6 bouts of diarrhea this morning.  Patient denies fever, chills, blood in stool, vomiting/nausea this morning, abdominal pain, dysuria, vaginal bleeding/discharge, back pain or CVA tenderness.  Patient reports that her sister at home was also having vomiting, diarrhea and abdominal cramping.    Emesis   This is a new problem. The current episode started in the past 7 days (Wednesday ). The problem occurs less than 2 times per day. The problem has been waxing and waning. There has been no fever. Associated symptoms include abdominal pain and diarrhea. Pertinent negatives include no arthralgias, chest pain, chills, coughing, decreased urine volume, dizziness, fever, headaches, myalgias, sweats, URI or weight loss. Associated symptoms comments: Sore throat, congestion . She has tried anti-emetic for the symptoms. The treatment provided mild relief.       Constitution: Negative. Negative for chills, sweating, fatigue and fever.   HENT: Negative.  Negative for ear pain, facial swelling, congestion and sore throat.    Neck: Negative for painful lymph nodes.   Cardiovascular: Negative.  Negative for chest trauma, chest pain and sob on exertion.   Eyes: Negative.  Negative for eye itching and eye pain.   Respiratory: Negative.  Negative for chest tightness, cough and asthma.    Gastrointestinal: Positive for abdominal pain, vomiting and diarrhea. Negative for nausea.   Endocrine: negative. cold intolerance and excessive thirst. "   Genitourinary: Negative.  Negative for dysuria, frequency, urgency, urine decreased and hematuria.   Musculoskeletal: Negative for pain, trauma, joint pain and muscle ache.   Skin: Negative.  Negative for rash, wound and hives.   Allergic/Immunologic: Negative.  Negative for eczema, asthma, hives and itching.   Neurological: Negative.  Negative for dizziness, headaches, disorientation and altered mental status.   Hematologic/Lymphatic: Negative.  Negative for swollen lymph nodes.   Psychiatric/Behavioral: Negative.  Negative for altered mental status, disorientation and confusion.       Objective:      Physical Exam   Constitutional: She is oriented to person, place, and time. She appears well-developed. She is cooperative.  Non-toxic appearance. She does not appear ill. No distress.   HENT:   Head: Normocephalic and atraumatic.   Ears:   Right Ear: Hearing, tympanic membrane, external ear and ear canal normal.   Left Ear: Hearing, tympanic membrane, external ear and ear canal normal.   Nose: Nose normal. No mucosal edema, rhinorrhea, nasal deformity or congestion. No epistaxis. Right sinus exhibits no maxillary sinus tenderness and no frontal sinus tenderness. Left sinus exhibits no maxillary sinus tenderness and no frontal sinus tenderness.   Mouth/Throat: Uvula is midline, oropharynx is clear and moist and mucous membranes are normal. No trismus in the jaw. Normal dentition. No uvula swelling. No oropharyngeal exudate, posterior oropharyngeal edema or posterior oropharyngeal erythema.   Eyes: Conjunctivae and lids are normal. No scleral icterus.   Neck: Trachea normal and phonation normal. Neck supple. No edema present. No erythema present. No neck rigidity present.   Cardiovascular: Normal rate, regular rhythm, normal heart sounds and normal pulses.   Pulmonary/Chest: Effort normal and breath sounds normal. No stridor. No respiratory distress. She has no decreased breath sounds. She has no wheezes. She has no  rhonchi. She has no rales. She exhibits no tenderness.   Abdominal: Normal appearance. Soft. flat abdomenThere is no abdominal tenderness. There is no rebound, no guarding, no left CVA tenderness and no right CVA tenderness. No hernia.   Genitourinary:    No vaginal discharge.     Musculoskeletal: Normal range of motion.         General: No deformity. Normal range of motion.   Neurological: no focal deficit. She is alert and oriented to person, place, and time. She exhibits normal muscle tone. Coordination normal.   Skin: Skin is warm, dry, intact, not diaphoretic and not pale.   Psychiatric: Her speech is normal and behavior is normal. Mood, judgment and thought content normal.   Nursing note and vitals reviewed.    The following results have been reviewed with the patient:  LABS-  Results for orders placed or performed in visit on 04/17/22   POCT COVID-19 Rapid Screening   Result Value Ref Range    POC Rapid COVID Negative Negative     Acceptable Yes    POCT Influenza A/B MOLECULAR   Result Value Ref Range    POC Molecular Influenza A Ag Negative Negative, Not Reported    POC Molecular Influenza B Ag Negative Negative, Not Reported     Acceptable Yes    POCT Urinalysis, Dipstick, Automated, W/O Scope   Result Value Ref Range    POC Blood, Urine Negative Negative    POC Bilirubin, Urine Negative Negative    POC Urobilinogen, Urine Normal 0.1 - 1.1    POC Ketones, Urine Negative Negative    POC Protein, Urine Negative Negative    POC Nitrates, Urine Negative Negative    POC Glucose, Urine Negative Negative    pH, UA 5.5 5 - 8    POC Specific Gravity, Urine 1.020 1.003 - 1.029    POC Leukocytes, Urine Negative Negative   POCT urine pregnancy   Result Value Ref Range    POC Preg Test, Ur Negative Negative     Acceptable Yes         IMAGING-  No results found.      Assessment:       1. Viral syndrome    2. Vomiting, intractability of vomiting not specified, presence of  nausea not specified, unspecified vomiting type    3. Abdominal cramping    4. Diarrhea, unspecified type    5. Nausea          Plan:       FOLLOWUP  Follow up if symptoms worsen or fail to improve, for PLEASE CONTACT PCP OR CONTACT THE EMERGENCY ROOM..     PATIENT INSTRUCTIONS  Patient Instructions   INSTRUCTIONS:  - Rest.  - Drink plenty of fluids.  - Take Tylenol and/or Ibuprofen as directed as needed for fever/pain.  Do not take more than the recommended dose.  - follow up with your PCP within the next 1-2 weeks as needed.  - You must understand that you have received an Urgent Care treatment only and that you may be released before all of your medical problems are known or treated.   - You, the patient, will arrange for follow up care as instructed.   - If your condition worsens or fails to improve we recommend that you receive another evaluation at the ER immediately or contact your PCP to discuss your concerns.   - You can call [phone] or [phone] to help schedule an appointment with the appropriate provider.              THANK YOU FOR ALLOWING ME TO PARTICIPATE IN YOUR HEALTHCARE,     George Rush NP   Viral syndrome    Vomiting, intractability of vomiting not specified, presence of nausea not specified, unspecified vomiting type  -     POCT COVID-19 Rapid Screening  -     POCT Influenza A/B MOLECULAR  -     POCT Urinalysis, Dipstick, Automated, W/O Scope  -     POCT urine pregnancy    Abdominal cramping  -     POCT Urinalysis, Dipstick, Automated, W/O Scope  -     POCT urine pregnancy  -     dicyclomine (BENTYL) 20 mg tablet; Take 1 tablet (20 mg total) by mouth 3 (three) times daily as needed (abdominal cramping).  Dispense: 30 tablet; Refill: 0  -     famotidine (PEPCID) 20 MG tablet; Take 1 tablet (20 mg total) by mouth 2 (two) times daily as needed for Heartburn.  Dispense: 30 tablet; Refill: 0    Diarrhea, unspecified type    Nausea  -     ondansetron (ZOFRAN-ODT) 4 MG TbDL; Take 1  tablet (4 mg total) by mouth 2 (two) times daily as needed (nausea).  Dispense: 20 tablet; Refill: 0                    electronic

## 2023-01-26 NOTE — BRIEF OPERATIVE NOTE - NSICDXBRIEFPROCEDURE_GEN_ALL_CORE_FT
PROCEDURES:  Excision, subcutaneous tumor, scalp, less than 2 cm 26-Jan-2023 14:26:46  Alejandro Andre

## 2023-01-26 NOTE — ASU DISCHARGE PLAN (ADULT/PEDIATRIC) - ASU DC SPECIAL INSTRUCTIONSFT
Follow up with Dr. Vega in 1-2 weeks. Call the office at the number below to schedule your appointment. You may shower; soap and water over incision sites. Do not scrub. Pat dry when done. No tub bathing or swimming until cleared. Keep incision sites out of the sun as scars will darken. Ambulate as tolerated. You may resume regular diet. You should be urinating at least 3-4x per day. Call the office if you experience increasing pain, nausea, vomiting, or temperature >101 F.    Please take Tylenol 650mg every six hours as needed for pain. For pain not controlled with Tylenol, please take Oxycodone 5mg every six hours, as needed.

## 2023-01-30 PROBLEM — Z86.59 PERSONAL HISTORY OF OTHER MENTAL AND BEHAVIORAL DISORDERS: Chronic | Status: ACTIVE | Noted: 2023-01-25

## 2023-01-31 ENCOUNTER — NON-APPOINTMENT (OUTPATIENT)
Age: 23
End: 2023-01-31

## 2023-01-31 LAB — SURGICAL PATHOLOGY STUDY: SIGNIFICANT CHANGE UP

## 2023-02-06 ENCOUNTER — APPOINTMENT (OUTPATIENT)
Dept: SURGERY | Facility: CLINIC | Age: 23
End: 2023-02-06
Payer: COMMERCIAL

## 2023-02-06 VITALS
BODY MASS INDEX: 21.99 KG/M2 | TEMPERATURE: 96.3 F | DIASTOLIC BLOOD PRESSURE: 85 MMHG | HEIGHT: 65 IN | OXYGEN SATURATION: 98 % | SYSTOLIC BLOOD PRESSURE: 128 MMHG | HEART RATE: 81 BPM | WEIGHT: 132 LBS

## 2023-02-06 DIAGNOSIS — Z83.438 FAMILY HISTORY OF OTHER DISORDER OF LIPOPROTEIN METABOLISM AND OTHER LIPIDEMIA: ICD-10-CM

## 2023-02-06 DIAGNOSIS — L72.11 PILAR CYST: ICD-10-CM

## 2023-02-06 DIAGNOSIS — Z83.3 FAMILY HISTORY OF DIABETES MELLITUS: ICD-10-CM

## 2023-02-06 DIAGNOSIS — Z78.9 OTHER SPECIFIED HEALTH STATUS: ICD-10-CM

## 2023-02-06 DIAGNOSIS — Z81.1 FAMILY HISTORY OF ALCOHOL ABUSE AND DEPENDENCE: ICD-10-CM

## 2023-02-06 DIAGNOSIS — Z82.49 FAMILY HISTORY OF ISCHEMIC HEART DISEASE AND OTHER DISEASES OF THE CIRCULATORY SYSTEM: ICD-10-CM

## 2023-02-06 PROCEDURE — 99024 POSTOP FOLLOW-UP VISIT: CPT

## 2023-02-06 NOTE — REASON FOR VISIT
[Post Op: _________] : a [unfilled] post op visit [FreeTextEntry1] : She underwent excision of a right scalp mass on January 26, 2023.

## 2023-02-06 NOTE — PHYSICAL EXAM
[Calm] : calm [de-identified] : NAD, comfortable [de-identified] : NCAT, no scleral icterus [de-identified] : No clubbing, cyanosis, or edema. [de-identified] : Warm, dry.  Her posterior right scalp incision is healing well without erythema or induration. [de-identified] : A&Ox3

## 2023-02-06 NOTE — ASSESSMENT
[FreeTextEntry1] : Ms. Vaughn is a 22-year-old woman who underwent excision of a right scalp mass on January 26, 2023.  She is recovering as expected and will follow up with me as needed.

## 2023-02-06 NOTE — HISTORY OF PRESENT ILLNESS
[de-identified] : Ms. Vaughn presented previously for evaluation and management of a mass of the right posterior scalp.  She stated the mass has been present for approximately 1 year.  She noticed the mass herself.  She denied significant pain of the mass, although it is sensitive to pressure.  She stated the mass is enlarging.  She presented on November 22, 2022, for follow up and to discuss scheduling surgery.  She reported the right posterior scalp mass has increased in size since her last visit.  She also noted a mass on the left anterior scalp near the top of her head, which has appeared since her prior visit.  She underwent excision of a right scalp mass on January 26, 2023. [de-identified] : She presented today for a routine post-operative visit.  She is overall feeling well.  She denied fever, chills, nausea, vomiting, diarrhea, or constipation.

## 2023-02-14 ENCOUNTER — TRANSCRIPTION ENCOUNTER (OUTPATIENT)
Age: 23
End: 2023-02-14

## 2023-02-15 ENCOUNTER — NON-APPOINTMENT (OUTPATIENT)
Age: 23
End: 2023-02-15

## 2023-02-15 ENCOUNTER — APPOINTMENT (OUTPATIENT)
Dept: FAMILY MEDICINE | Facility: CLINIC | Age: 23
End: 2023-02-15
Payer: COMMERCIAL

## 2023-02-15 DIAGNOSIS — J40 BRONCHITIS, NOT SPECIFIED AS ACUTE OR CHRONIC: ICD-10-CM

## 2023-02-15 PROCEDURE — 99213 OFFICE O/P EST LOW 20 MIN: CPT | Mod: 95

## 2023-02-15 RX ORDER — FLUTICASONE PROPIONATE 50 UG/1
50 SPRAY, METERED NASAL TWICE DAILY
Qty: 1 | Refills: 1 | Status: ACTIVE | COMMUNITY
Start: 2023-02-15 | End: 1900-01-01

## 2023-02-15 NOTE — HISTORY OF PRESENT ILLNESS
[Home] : at home, [unfilled] , at the time of the visit. [Other Location: e.g. Home (Enter Location, City,State)___] : at [unfilled] [Verbal consent obtained from patient] : the patient, [unfilled] [de-identified] : 23 yo female here for sick visit. Pt reports that she has been sick for 2 weeks. Had cough and congestion. Now with ear pain since Monday. Went to sleep and woke up with pain on the left side. Somewhat muffled hearing and feels pressure as well. No fever, chills or body aches. Took 1 COVID test which negative 1.5 weeks ago. No sore throat currently. Reports that cough is productive of yellow/green phlegm.

## 2023-04-06 ENCOUNTER — NON-APPOINTMENT (OUTPATIENT)
Age: 23
End: 2023-04-06

## 2023-04-07 ENCOUNTER — TRANSCRIPTION ENCOUNTER (OUTPATIENT)
Age: 23
End: 2023-04-07

## 2023-04-10 ENCOUNTER — TRANSCRIPTION ENCOUNTER (OUTPATIENT)
Age: 23
End: 2023-04-10

## 2023-04-10 RX ORDER — OFLOXACIN OTIC 3 MG/ML
0.3 SOLUTION AURICULAR (OTIC) TWICE DAILY
Qty: 2 | Refills: 1 | Status: ACTIVE | COMMUNITY
Start: 2020-01-31 | End: 1900-01-01

## 2023-04-12 ENCOUNTER — APPOINTMENT (OUTPATIENT)
Dept: OTOLARYNGOLOGY | Facility: CLINIC | Age: 23
End: 2023-04-12
Payer: COMMERCIAL

## 2023-04-12 VITALS
HEART RATE: 77 BPM | TEMPERATURE: 98.2 F | BODY MASS INDEX: 21.99 KG/M2 | OXYGEN SATURATION: 98 % | SYSTOLIC BLOOD PRESSURE: 125 MMHG | WEIGHT: 132 LBS | HEIGHT: 65 IN | DIASTOLIC BLOOD PRESSURE: 87 MMHG

## 2023-04-12 DIAGNOSIS — H92.02 OTALGIA, LEFT EAR: ICD-10-CM

## 2023-04-12 PROCEDURE — 69210 REMOVE IMPACTED EAR WAX UNI: CPT

## 2023-04-12 PROCEDURE — 99202 OFFICE O/P NEW SF 15 MIN: CPT | Mod: 25

## 2023-04-12 PROCEDURE — 99204 OFFICE O/P NEW MOD 45 MIN: CPT | Mod: 25

## 2023-04-12 NOTE — HISTORY OF PRESENT ILLNESS
[de-identified] : CC: ear discomfort\par \par HISTORY OF PRESENT ILLNESS: Ms. Vaughn is a pleasant 22 year old lady with bilateral ear discomfort\par known issues with her external ears, chronic issues had to use ofloaxcin drops in the past\par does not use qtips, last episode 2 years ago\par left worse than right irritation today\par no significant hearing loss drainage or vertigo\par no history of allergic disease including eczema, atopic dermatitis\par \par REVIEW OF SYSTEMS: \par General ROS: negative for - chills, fatigue or fever\par Psychological ROS: negative for - anxiety or depression\par Ophthalmic ROS: negative for - blurry vision, decreased vision or double vision \par ENT ROS: negative except as noted from HPI\par Allergy and Immunology ROS: negative except as noted from HPI\par Hematological and Lymphatic ROS: negative for - bleeding problems \par Endocrine ROS: negative for - malaise/lethargy\par Respiratory ROS: negative for - stridor\par Cardiovascular ROS: negative for - chest pain\par Gastrointestinal ROS: negative for - appetite loss or nausea/vomiting\par Genitourinary ROS: negative for - incontinence\par Musculoskeletal ROS: negative for - gait disturbance \par Neurological ROS: negative for - behavioral changes\par Dermatological ROS: negative for - nail changes\par \par Physical Exam:\par \par GENERAL APPEARANCE: Well-developed and No Acute Distress.\par COMMUNICATION: Able to Communicate. Strong Voice.\par \par HEAD AND FACE\par Eyes: Testing of ocular motility including primary gaze alignment normal.\par Inspection and Appearance: No evidence of lesions or masses\par Palpation: Palpation of the face reveals no sinus tenderness\par Salivary Glands: Symmetric without masses\par Facial Strength: Symmetric without evidence of facial paralysis\par \par EAR, NOSE, MOUTH, and THROAT:\par Ear Canals and Tympanic Membranes, Bilateral: No evidence of inflammation or lesions.\par Thresholds: Clinical speech reception thresholds normal.\par External, Nose and Auricle: No lesions or masses.\par \par NECK:\par Evaluation: No evidence of masses or crepitus. The neck is symmetric and the trachea is in the midline position.\par Thyroid: No evidence of enlargement, tenderness or mass.\par Neck Lymph Nodes: WNL.\par Respiratory: Inspection of the chest including symmetry, expansion and/or assessment of respiratory effort normal.\par Cardiovascular: Evaluation of peripheral vascular system by observation and palpation of capillary refill, normal.\par Neurological/Psychiatric: Alert, Oriented, Mood, and Affect Normal.\par \par PROCEDURE: Removal impacted cerumen requiring instrumentation. (16641)\par \par PREOPERATIVE DIAGNOSIS: Cerumen Impaction\par \par POSTOPERATIVE DIAGNOSIS Dx: Same\par \par INDICATION FOR PROCEDURE: Patient has noted fullness and hearing loss in the affected ears for significant period of time.\par \par EXAM FINDINGS: Auditory canal(s) was obstructed with cerumen.  Cerumen was observed with the microscope after the ear speculum was placed in the EAC, and gently loosened with the cerumen loop circumferentially.  The cerumen was then removed using suction, cerumen loop, and irrigation.  The tympanic membranes are intact following the procedure.  Auditory canals appear minimally inflamed.\par \par Left ear: some erythema, nonobstructive cerumen, removed. TMI. no significant inflammation. Right ear: significant CI, removed. TMI\par \par IMPRESSION: Ms. Vaughn is a pleasant 22 year old lady with AU CI s/p removal\par \par PLAN:\par -avoid water in the ears, use ear plugs\par -mineral oil for comfort\par -RTC PRN\par \par MD ELANA Shell\par Rhinology and Skull Base Surgery\par Department of Otolaryngology- Head and Neck Surgery\par [facility]\par [facility]\par

## 2023-06-14 ENCOUNTER — APPOINTMENT (OUTPATIENT)
Dept: OTOLARYNGOLOGY | Facility: CLINIC | Age: 23
End: 2023-06-14
Payer: COMMERCIAL

## 2023-06-14 VITALS
OXYGEN SATURATION: 97 % | SYSTOLIC BLOOD PRESSURE: 109 MMHG | HEIGHT: 65 IN | BODY MASS INDEX: 21.99 KG/M2 | DIASTOLIC BLOOD PRESSURE: 72 MMHG | HEART RATE: 72 BPM | TEMPERATURE: 97.6 F | WEIGHT: 132 LBS

## 2023-06-14 DIAGNOSIS — L29.9 PRURITUS, UNSPECIFIED: ICD-10-CM

## 2023-06-14 DIAGNOSIS — H61.23 IMPACTED CERUMEN, BILATERAL: ICD-10-CM

## 2023-06-14 PROCEDURE — 69210 REMOVE IMPACTED EAR WAX UNI: CPT

## 2023-06-14 PROCEDURE — 99214 OFFICE O/P EST MOD 30 MIN: CPT | Mod: 25

## 2023-06-14 RX ORDER — FLUOCINOLONE ACETONIDE 0.11 MG/ML
0.01 OIL AURICULAR (OTIC) DAILY
Qty: 1 | Refills: 0 | Status: ACTIVE | COMMUNITY
Start: 2023-06-14 | End: 1900-01-01

## 2023-06-14 NOTE — HISTORY OF PRESENT ILLNESS
[de-identified] : CC: ear discomfort\par \par HISTORY OF PRESENT ILLNESS: Ms. Vaughn is a pleasant 22 year old lady with bilateral ear discomfort\par known issues with her external ears, chronic issues had to use ofloaxcin drops in the past\par does not use qtips, last episode 2 years ago\par left worse than right irritation today\par no significant hearing loss drainage or vertigo\par no history of allergic disease including eczema, atopic dermatitis\par \par 2 month unscheduled follow up\par tried using mineral oil\par still have a lot of itching\par \par REVIEW OF SYSTEMS: \par General ROS: negative for - chills, fatigue or fever\par Psychological ROS: negative for - anxiety or depression\par Ophthalmic ROS: negative for - blurry vision, decreased vision or double vision \par ENT ROS: negative except as noted from HPI\par Allergy and Immunology ROS: negative except as noted from HPI\par Hematological and Lymphatic ROS: negative for - bleeding problems \par Endocrine ROS: negative for - malaise/lethargy\par Respiratory ROS: negative for - stridor\par Cardiovascular ROS: negative for - chest pain\par Gastrointestinal ROS: negative for - appetite loss or nausea/vomiting\par Genitourinary ROS: negative for - incontinence\par Musculoskeletal ROS: negative for - gait disturbance \par Neurological ROS: negative for - behavioral changes\par Dermatological ROS: negative for - nail changes\par \par Physical Exam:\par \par GENERAL APPEARANCE: Well-developed and No Acute Distress.\par COMMUNICATION: Able to Communicate. Strong Voice.\par \par HEAD AND FACE\par Eyes: Testing of ocular motility including primary gaze alignment normal.\par Inspection and Appearance: No evidence of lesions or masses\par Palpation: Palpation of the face reveals no sinus tenderness\par Salivary Glands: Symmetric without masses\par Facial Strength: Symmetric without evidence of facial paralysis\par \par EAR, NOSE, MOUTH, and THROAT:\par Ear Canals and Tympanic Membranes, Bilateral: No evidence of inflammation or lesions.\par Thresholds: Clinical speech reception thresholds normal.\par External, Nose and Auricle: No lesions or masses.\par \par NECK:\par Evaluation: No evidence of masses or crepitus. The neck is symmetric and the trachea is in the midline position.\par Thyroid: No evidence of enlargement, tenderness or mass.\par Neck Lymph Nodes: WNL.\par Respiratory: Inspection of the chest including symmetry, expansion and/or assessment of respiratory effort normal.\par Cardiovascular: Evaluation of peripheral vascular system by observation and palpation of capillary refill, normal.\par Neurological/Psychiatric: Alert, Oriented, Mood, and Affect Normal.\par \par PROCEDURE: Removal impacted cerumen requiring instrumentation. ([id number])\par \par PREOPERATIVE DIAGNOSIS: Cerumen Impaction\par \par POSTOPERATIVE DIAGNOSIS Dx: Same\par \par INDICATION FOR PROCEDURE: Patient has noted fullness and hearing loss in the affected ears for significant period of time.\par \par EXAM FINDINGS: Auditory canal(s) was obstructed with cerumen.  Cerumen was observed with the microscope after the ear speculum was placed in the EAC, and gently loosened with the cerumen loop circumferentially.  The cerumen was then removed using suction, cerumen loop, and irrigation.  The tympanic membranes are intact following the procedure.  Auditory canals appear minimally inflamed.\par \par Left ear: nonobstructive cerumen, removed. TMI. no significant inflammation. Right ear: significant CI, removed. TMI\par \par IMPRESSION: Ms. Vaughn is a pleasant 22 year old lady with AU CI s/p removal\par \par PLAN:\par -mineral oil, dermotic given\par -RTC PRN\par \par MD ELANA Shell\par Rhinology and Skull Base Surgery\par Department of Otolaryngology- Head and Neck Surgery\par [facility]\par [facility]\par

## 2024-04-02 ENCOUNTER — APPOINTMENT (OUTPATIENT)
Dept: FAMILY MEDICINE | Facility: CLINIC | Age: 24
End: 2024-04-02
Payer: COMMERCIAL

## 2024-04-02 VITALS
SYSTOLIC BLOOD PRESSURE: 119 MMHG | WEIGHT: 132 LBS | BODY MASS INDEX: 21.99 KG/M2 | HEART RATE: 83 BPM | OXYGEN SATURATION: 97 % | TEMPERATURE: 96.7 F | DIASTOLIC BLOOD PRESSURE: 78 MMHG | HEIGHT: 65 IN

## 2024-04-02 DIAGNOSIS — D50.8 OTHER IRON DEFICIENCY ANEMIAS: ICD-10-CM

## 2024-04-02 DIAGNOSIS — Z00.00 ENCOUNTER FOR GENERAL ADULT MEDICAL EXAMINATION W/OUT ABNORMAL FINDINGS: ICD-10-CM

## 2024-04-02 DIAGNOSIS — F17.210 NICOTINE DEPENDENCE, CIGARETTES, UNCOMPLICATED: ICD-10-CM

## 2024-04-02 DIAGNOSIS — R53.83 OTHER FATIGUE: ICD-10-CM

## 2024-04-02 PROCEDURE — 99395 PREV VISIT EST AGE 18-39: CPT | Mod: 25

## 2024-04-02 PROCEDURE — 36415 COLL VENOUS BLD VENIPUNCTURE: CPT

## 2024-04-02 RX ORDER — LEVONORGESTREL 52 MG/1
20 INTRAUTERINE DEVICE INTRAUTERINE
Refills: 0 | Status: DISCONTINUED | COMMUNITY
Start: 2017-12-20 | End: 2024-04-02

## 2024-04-02 RX ORDER — AZITHROMYCIN 250 MG/1
250 TABLET, FILM COATED ORAL
Qty: 6 | Refills: 0 | Status: DISCONTINUED | COMMUNITY
Start: 2023-02-15 | End: 2024-04-02

## 2024-04-02 RX ORDER — CIPROFLOXACIN AND DEXAMETHASONE 3; 1 MG/ML; MG/ML
0.3-0.1 SUSPENSION/ DROPS AURICULAR (OTIC) TWICE DAILY
Qty: 1 | Refills: 0 | Status: DISCONTINUED | COMMUNITY
Start: 2021-11-24 | End: 2024-04-02

## 2024-04-02 NOTE — HEALTH RISK ASSESSMENT
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

## 2024-04-02 NOTE — HISTORY OF PRESENT ILLNESS
[de-identified] : 22 yo female here for CPE.  Reports a few months of fatigue. Had IUD removed 2 months ago. Has had periods every 3 weeks or so since then. Does have history of iron deficiency. Depression has also worsened with fatigue. Pt had therapist, not interested in medication at this time.  Also wants Derm referral for skin check.

## 2024-04-02 NOTE — PHYSICAL EXAM
[Well Nourished] : well nourished [No Acute Distress] : no acute distress [Well Developed] : well developed [Well-Appearing] : well-appearing [Normal Sclera/Conjunctiva] : normal sclera/conjunctiva [EOMI] : extraocular movements intact [PERRL] : pupils equal round and reactive to light [Normal Oropharynx] : the oropharynx was normal [Normal Outer Ear/Nose] : the outer ears and nose were normal in appearance [No JVD] : no jugular venous distention [No Lymphadenopathy] : no lymphadenopathy [Supple] : supple [Thyroid Normal, No Nodules] : the thyroid was normal and there were no nodules present [No Respiratory Distress] : no respiratory distress  [No Accessory Muscle Use] : no accessory muscle use [Normal Rate] : normal rate  [Clear to Auscultation] : lungs were clear to auscultation bilaterally normal... [Regular Rhythm] : with a regular rhythm [Normal S1, S2] : normal S1 and S2 [No Murmur] : no murmur heard [No Abdominal Bruit] : a ~M bruit was not heard ~T in the abdomen [No Varicosities] : no varicosities [No Edema] : there was no peripheral edema [No Palpable Aorta] : no palpable aorta [No Extremity Clubbing/Cyanosis] : no extremity clubbing/cyanosis [Non Tender] : non-tender [Soft] : abdomen soft [Non-distended] : non-distended [No Masses] : no abdominal mass palpated [No HSM] : no HSM [Normal Bowel Sounds] : normal bowel sounds [Normal Posterior Cervical Nodes] : no posterior cervical lymphadenopathy [No CVA Tenderness] : no CVA  tenderness [Normal Anterior Cervical Nodes] : no anterior cervical lymphadenopathy [No Spinal Tenderness] : no spinal tenderness [Grossly Normal Strength/Tone] : grossly normal strength/tone [No Joint Swelling] : no joint swelling [Coordination Grossly Intact] : coordination grossly intact [No Rash] : no rash [No Focal Deficits] : no focal deficits [Normal Gait] : normal gait [Normal Affect] : the affect was normal [Normal Insight/Judgement] : insight and judgment were intact

## 2024-04-02 NOTE — ASSESSMENT
[FreeTextEntry1] : CPE- Well exam, comprehensive labs ordered, drawn today. PAP reported up to date. STD panel declined. Follow up labs.

## 2024-04-03 LAB
25(OH)D3 SERPL-MCNC: 57.1 NG/ML
ALBUMIN SERPL ELPH-MCNC: 4.8 G/DL
ALP BLD-CCNC: 60 U/L
ALT SERPL-CCNC: 14 U/L
ANION GAP SERPL CALC-SCNC: 11 MMOL/L
APPEARANCE: CLEAR
AST SERPL-CCNC: 17 U/L
BACTERIA: ABNORMAL /HPF
BASOPHILS # BLD AUTO: 0.03 K/UL
BASOPHILS NFR BLD AUTO: 0.5 %
BILIRUB SERPL-MCNC: 0.4 MG/DL
BILIRUBIN URINE: NEGATIVE
BLOOD URINE: NEGATIVE
BUN SERPL-MCNC: 16 MG/DL
CALCIUM SERPL-MCNC: 9.6 MG/DL
CAST: 1 /LPF
CHLORIDE SERPL-SCNC: 103 MMOL/L
CHOLEST SERPL-MCNC: 188 MG/DL
CO2 SERPL-SCNC: 28 MMOL/L
COLOR: YELLOW
CREAT SERPL-MCNC: 0.76 MG/DL
EGFR: 112 ML/MIN/1.73M2
EOSINOPHIL # BLD AUTO: 0.07 K/UL
EOSINOPHIL NFR BLD AUTO: 1.2 %
EPITHELIAL CELLS: 11 /HPF
ESTIMATED AVERAGE GLUCOSE: 103 MG/DL
FERRITIN SERPL-MCNC: 34 NG/ML
FOLATE SERPL-MCNC: >20 NG/ML
GLUCOSE QUALITATIVE U: NEGATIVE MG/DL
GLUCOSE SERPL-MCNC: 92 MG/DL
HBA1C MFR BLD HPLC: 5.2 %
HCT VFR BLD CALC: 38.7 %
HDLC SERPL-MCNC: 79 MG/DL
HGB BLD-MCNC: 13.3 G/DL
IMM GRANULOCYTES NFR BLD AUTO: 0.3 %
IRON SATN MFR SERPL: 29 %
IRON SERPL-MCNC: 102 UG/DL
KETONES URINE: NEGATIVE MG/DL
LDLC SERPL CALC-MCNC: 86 MG/DL
LEUKOCYTE ESTERASE URINE: ABNORMAL
LYMPHOCYTES # BLD AUTO: 1.49 K/UL
LYMPHOCYTES NFR BLD AUTO: 25.2 %
MAN DIFF?: NORMAL
MCHC RBC-ENTMCNC: 32.3 PG
MCHC RBC-ENTMCNC: 34.4 GM/DL
MCV RBC AUTO: 93.9 FL
MICROSCOPIC-UA: NORMAL
MONOCYTES # BLD AUTO: 0.4 K/UL
MONOCYTES NFR BLD AUTO: 6.8 %
NEUTROPHILS # BLD AUTO: 3.91 K/UL
NEUTROPHILS NFR BLD AUTO: 66 %
NITRITE URINE: NEGATIVE
NONHDLC SERPL-MCNC: 109 MG/DL
PH URINE: 7
PLATELET # BLD AUTO: 225 K/UL
POTASSIUM SERPL-SCNC: 4.2 MMOL/L
PROT SERPL-MCNC: 7 G/DL
PROTEIN URINE: NEGATIVE MG/DL
RBC # BLD: 4.12 M/UL
RBC # FLD: 12.5 %
RED BLOOD CELLS URINE: 1 /HPF
SODIUM SERPL-SCNC: 142 MMOL/L
SPECIFIC GRAVITY URINE: 1.01
T4 FREE SERPL-MCNC: 1.2 NG/DL
TIBC SERPL-MCNC: 355 UG/DL
TRANSFERRIN SERPL-MCNC: 293 MG/DL
TRIGL SERPL-MCNC: 131 MG/DL
TSH SERPL-ACNC: 0.77 UIU/ML
UIBC SERPL-MCNC: 252 UG/DL
UROBILINOGEN URINE: 0.2 MG/DL
VIT B12 SERPL-MCNC: 973 PG/ML
WBC # FLD AUTO: 5.92 K/UL
WHITE BLOOD CELLS URINE: 3 /HPF

## 2024-04-05 ENCOUNTER — NON-APPOINTMENT (OUTPATIENT)
Age: 24
End: 2024-04-05

## 2024-04-08 ENCOUNTER — TRANSCRIPTION ENCOUNTER (OUTPATIENT)
Age: 24
End: 2024-04-08

## 2024-04-08 DIAGNOSIS — N39.0 URINARY TRACT INFECTION, SITE NOT SPECIFIED: ICD-10-CM

## 2024-04-08 RX ORDER — SULFAMETHOXAZOLE AND TRIMETHOPRIM 800; 160 MG/1; MG/1
800-160 TABLET ORAL TWICE DAILY
Qty: 6 | Refills: 0 | Status: ACTIVE | COMMUNITY
Start: 2024-04-08 | End: 1900-01-01

## 2024-04-11 ENCOUNTER — APPOINTMENT (OUTPATIENT)
Dept: DERMATOLOGY | Facility: CLINIC | Age: 24
End: 2024-04-11
Payer: COMMERCIAL

## 2024-04-11 VITALS — WEIGHT: 135 LBS | BODY MASS INDEX: 22.49 KG/M2 | HEIGHT: 65 IN

## 2024-04-11 DIAGNOSIS — L21.9 SEBORRHEIC DERMATITIS, UNSPECIFIED: ICD-10-CM

## 2024-04-11 DIAGNOSIS — L40.9 PSORIASIS, UNSPECIFIED: ICD-10-CM

## 2024-04-11 DIAGNOSIS — D48.9 NEOPLASM OF UNCERTAIN BEHAVIOR, UNSPECIFIED: ICD-10-CM

## 2024-04-11 PROCEDURE — 99204 OFFICE O/P NEW MOD 45 MIN: CPT | Mod: 25

## 2024-04-11 PROCEDURE — 11104 PUNCH BX SKIN SINGLE LESION: CPT

## 2024-04-11 NOTE — HISTORY OF PRESENT ILLNESS
[FreeTextEntry1] : NPV- Mole Check / Ear Irritation  [de-identified] : Karla Vaughn is a 24 y/o F presenting to the clinic for a mole on the center of her back and irritation inside her ear.   1) Mole on back: Patient unsure of how long it was present. No symptoms. The mole is darker with lighter parts.   2) Ear irritation inside ear: Present for years and is dry and itchy. Patient went to the ENT. Possibly swimmer's ear or eczema. Has tried fluocinolone oil. Helps but recurs after a few months.

## 2024-04-11 NOTE — ASSESSMENT
[FreeTextEntry1] : #Chronic bilateral ear rash ddx: favor seborrheic dermatitis with possible psoriasis /sebopsoriasis  -has tried fluocinolone otic oil, which helps but rash recurs. She worries about long term side effects and wants to try another agent.  -start zoryve 0.3% cream daily. rx sent, sample provided since she is having active symptoms. 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  Photographs obtained.   Recommended lesion removal by punch technique.  Reason: tumor of uncertain behavior / irritated lesion.   Excision by 6mm Punch Technique with Simple Closure Procedure Note.   Lesion size: 0.5cm Cleaned with: alcohol wipe Closure Length: 0.6 cm. Simple closure: 4-0 Chromic interrupted skin sutures.   After discussion of risks, written informed consent was obtained.  a time out was performed.   Following intradermal injection of 1% lidocaine with 1:1000,000 epinephrine, an excision by punch technique was performed down to the subcutaneous tissue.   A bandage was placed and wound care was reviewed with the patient. The specimen was sent to Pathology for review.  Estimated blood loss: Minimal. Complications: none..

## 2024-04-11 NOTE — PHYSICAL EXAM
[Alert] : alert [Oriented x 3] : ~L oriented x 3 [Well Nourished] : well nourished [Conjunctiva Non-injected] : conjunctiva non-injected [No Visual Lymphadenopathy] : no visual  lymphadenopathy [No Clubbing] : no clubbing [No Edema] : no edema [No Bromhidrosis] : no bromhidrosis [No Chromhidrosis] : no chromhidrosis [Face] : Face [Ears] : Ears [Back] : Back [FreeTextEntry3] : L upper back : 5x5mm smooth papule with ill defined borders and globules   external ear canal: ill defined erythema with flaking

## 2024-04-12 ENCOUNTER — RESULT REVIEW (OUTPATIENT)
Age: 24
End: 2024-04-12

## 2024-04-18 LAB — DERMATOLOGY BIOPSY: NORMAL

## 2024-06-16 ENCOUNTER — NON-APPOINTMENT (OUTPATIENT)
Age: 24
End: 2024-06-16

## 2024-06-17 ENCOUNTER — NON-APPOINTMENT (OUTPATIENT)
Age: 24
End: 2024-06-17

## 2024-06-18 ENCOUNTER — APPOINTMENT (OUTPATIENT)
Dept: FAMILY MEDICINE | Facility: CLINIC | Age: 24
End: 2024-06-18
Payer: COMMERCIAL

## 2024-06-18 DIAGNOSIS — U07.1 COVID-19: ICD-10-CM

## 2024-06-18 PROCEDURE — 99213 OFFICE O/P EST LOW 20 MIN: CPT | Mod: 95

## 2024-06-18 RX ORDER — BENZONATATE 100 MG/1
100 CAPSULE ORAL 3 TIMES DAILY
Qty: 21 | Refills: 0 | Status: ACTIVE | COMMUNITY
Start: 2024-06-18 | End: 1900-01-01

## 2024-06-18 RX ORDER — ROFLUMILAST 3 MG/G
0.3 CREAM TOPICAL
Qty: 60 | Refills: 2 | Status: ACTIVE | COMMUNITY
Start: 2024-04-11 | End: 1900-01-01

## 2024-06-18 RX ORDER — FLUTICASONE PROPIONATE 50 UG/1
50 SPRAY, METERED NASAL
Qty: 1 | Refills: 0 | Status: ACTIVE | COMMUNITY
Start: 2024-06-18 | End: 1900-01-01

## 2024-06-18 RX ORDER — NIRMATRELVIR AND RITONAVIR 300-100 MG
20 X 150 MG & KIT ORAL
Qty: 1 | Refills: 0 | Status: ACTIVE | COMMUNITY
Start: 2024-06-17 | End: 1900-01-01

## 2024-06-19 NOTE — HISTORY OF PRESENT ILLNESS
[Home] : at home, [unfilled] , at the time of the visit. [Other Location: e.g. Home (Enter Location, City,State)___] : at [unfilled] [Verbal consent obtained from patient] : the patient, [unfilled] [FreeTextEntry8] : 25 yo female here for sick visist. Started feeling sick Sunday with sore throat and headache. Monday tested positive for COVID. Developed fever, postnasal drip and cough. No SOB or chest pain. Fever 99. No body aches or fatigue. Generally mild symptoms. Has been isolating since yesterday.

## 2024-07-11 ENCOUNTER — APPOINTMENT (OUTPATIENT)
Dept: DERMATOLOGY | Facility: CLINIC | Age: 24
End: 2024-07-11

## 2024-09-25 ENCOUNTER — TRANSCRIPTION ENCOUNTER (OUTPATIENT)
Age: 24
End: 2024-09-25

## 2024-09-25 DIAGNOSIS — M79.643 PAIN IN UNSPECIFIED HAND: ICD-10-CM

## 2024-09-26 ENCOUNTER — NON-APPOINTMENT (OUTPATIENT)
Age: 24
End: 2024-09-26

## 2024-10-04 ENCOUNTER — APPOINTMENT (OUTPATIENT)
Dept: ORTHOPEDIC SURGERY | Facility: CLINIC | Age: 24
End: 2024-10-04
Payer: COMMERCIAL

## 2024-10-04 VITALS — WEIGHT: 135 LBS | HEIGHT: 65 IN | RESPIRATION RATE: 16 BRPM | BODY MASS INDEX: 22.49 KG/M2

## 2024-10-04 DIAGNOSIS — M25.531 PAIN IN RIGHT WRIST: ICD-10-CM

## 2024-10-04 DIAGNOSIS — G56.21 LESION OF ULNAR NERVE, RIGHT UPPER LIMB: ICD-10-CM

## 2024-10-04 DIAGNOSIS — R20.0 ANESTHESIA OF SKIN: ICD-10-CM

## 2024-10-04 DIAGNOSIS — R20.2 ANESTHESIA OF SKIN: ICD-10-CM

## 2024-10-04 DIAGNOSIS — M77.01 MEDIAL EPICONDYLITIS, RIGHT ELBOW: ICD-10-CM

## 2024-10-04 PROCEDURE — 73110 X-RAY EXAM OF WRIST: CPT | Mod: RT

## 2024-10-04 PROCEDURE — 99203 OFFICE O/P NEW LOW 30 MIN: CPT

## 2024-10-04 PROCEDURE — 73070 X-RAY EXAM OF ELBOW: CPT | Mod: RT

## 2024-10-04 NOTE — CONSULT LETTER
[Dear  ___] : Dear  [unfilled], [Consult Letter:] : I had the pleasure of evaluating your patient, [unfilled]. [Please see my note below.] : Please see my note below. [Consult Closing:] : Thank you very much for allowing me to participate in the care of this patient.  If you have any questions, please do not hesitate to contact me. [Sincerely,] : Sincerely, [FreeTextEntry3] : Av Parekh M.D., FAAOS Co-Director [facility]

## 2024-10-04 NOTE — PHYSICAL EXAM
[de-identified] : Physical exam shows the patient to be alert and oriented x3, capable of ambulation. The patient is well-developed and well-nourished in no apparent respiratory distress. Majority of the skin is intact bilaterally in the upper extremities without lymphadenopathy at the elbows.  There is a negative Spurling test. There is no sensitivity or Tinel's over the axilla bilaterally in the area of the brachial plexus. Sensitivity over the ulnar nerve the right elbow but no evidence of instability The elbows have a symmetric and full range of motion bilaterally without evidence of pain on forced flexion or extension. No masses are palpable bilaterally. There is no tenderness or instability of the medial or lateral collateral ligaments bilaterally. There is no joint effusion. There is no tenderness over the biceps or triceps tendons bilaterally with 5 over 5 strength. There is no tenderness over the lateral epicondyles bilaterally. There is tenderness over the right medial epicondyle with pain increased with forced wrist flexion and resisted pronation which localizes to the medial epicondyle. This is negative on the opposite asymptomatic side. There is no tenderness over the radial tunnel. There is no sensitivity over the radial , median nerves and no changes in sensation with provocative movements over these nerves. There is no instability or sensitivity over the ulnar nerve through a full range of motion and was provocative tests bilaterally.  There is full range of motion of wrist with no tenderness over the radial ulnar joint or TFCC no tenderness over the scaphoid scapholunate or lunotriquetral ligaments no evidence of instability.  There is tenderness over the pisotriquetral joint of the right wrist negative on the left.  There is 2+ pulses of the radial arteries bilaterally. Gucci's test shows excellent flow through the radial and ulnar arteries with an intact arch bilaterally.  No sensitivity over the radial ulnar and median nerves level the elbow and wrist.  Sensation grossly intact. [de-identified] : PA and lateral of the right elbow shows no evidence of arthritis with joint spaces well-preserved  PA lateral and oblique of the right wrist as well as a pisotriquetral view shows no evidence of soft tissue calcifications and joint spaces well-preserved.

## 2024-10-04 NOTE — ASSESSMENT
[FreeTextEntry1] : Patient appears to have a right elbow medial epicondylitis with secondary cubital tunnel as well as a pisotriquetral synovitis secondary to overuse from pulling canvas which is significantly increased over the past few weeks.  The risks, benefits, alternatives and associated differential diagnosis was discussed with the patient. Options ranged from conservative care, therapy to surgical intervention were reviewed and all questions answered. Risks included incomplete resolution of symptoms, worsening of symptoms recurrence, tissue loss, functional loss and other risks associated with treatment of this condition Patient appeared to have an excellent understanding of the risks as well as differential diagnosis associated with this condition.   She elected to proceed with conservative management, therapy and splinting as well as topicals were discussed and a prescription provided  If symptoms do not begin to improve over the next several weeks and resolve over the next few months she will return the office for reevaluation and further treatment or diagnostic studies may be obtained.  If symptoms resolve she can follow on an as-needed basis.  She will also be provided with a note stating no heavy lifting greater than 5 pounds with the right upper extremity for 6 weeks..

## 2024-10-04 NOTE — HISTORY OF PRESENT ILLNESS
[FreeTextEntry1] : Patient presents with numbness and tingling on digits 4 and 5 and right ulnar sided wrist discomfort radiating up the forearm  on the right hand which started about 2 weeks ago. 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  Patient has tried wearing a removable splint on the wrist with minimal improvement

## 2024-11-22 ENCOUNTER — NON-APPOINTMENT (OUTPATIENT)
Age: 24
End: 2024-11-22

## 2024-11-26 ENCOUNTER — NON-APPOINTMENT (OUTPATIENT)
Age: 24
End: 2024-11-26

## 2024-12-02 ENCOUNTER — APPOINTMENT (OUTPATIENT)
Dept: OTOLARYNGOLOGY | Facility: CLINIC | Age: 24
End: 2024-12-02
Payer: COMMERCIAL

## 2024-12-02 DIAGNOSIS — H61.23 IMPACTED CERUMEN, BILATERAL: ICD-10-CM

## 2024-12-02 DIAGNOSIS — H60.8X3 OTHER OTITIS EXTERNA, BILATERAL: ICD-10-CM

## 2024-12-02 PROCEDURE — 69210 REMOVE IMPACTED EAR WAX UNI: CPT

## 2024-12-02 PROCEDURE — 99213 OFFICE O/P EST LOW 20 MIN: CPT | Mod: 25

## 2024-12-03 ENCOUNTER — TRANSCRIPTION ENCOUNTER (OUTPATIENT)
Age: 24
End: 2024-12-03

## 2024-12-04 ENCOUNTER — TRANSCRIPTION ENCOUNTER (OUTPATIENT)
Age: 24
End: 2024-12-04

## 2024-12-31 ENCOUNTER — APPOINTMENT (OUTPATIENT)
Dept: DERMATOLOGY | Facility: CLINIC | Age: 24
End: 2024-12-31

## 2025-01-13 ENCOUNTER — APPOINTMENT (OUTPATIENT)
Dept: ORTHOPEDIC SURGERY | Facility: CLINIC | Age: 25
End: 2025-01-13

## 2025-01-13 VITALS — BODY MASS INDEX: 22.49 KG/M2 | HEIGHT: 65 IN | WEIGHT: 135 LBS | RESPIRATION RATE: 16 BRPM

## 2025-01-13 DIAGNOSIS — M25.531 PAIN IN RIGHT WRIST: ICD-10-CM

## 2025-01-13 DIAGNOSIS — M77.01 MEDIAL EPICONDYLITIS, RIGHT ELBOW: ICD-10-CM

## 2025-01-13 DIAGNOSIS — R20.0 ANESTHESIA OF SKIN: ICD-10-CM

## 2025-01-13 DIAGNOSIS — R20.2 ANESTHESIA OF SKIN: ICD-10-CM

## 2025-01-13 PROCEDURE — 20605 DRAIN/INJ JOINT/BURSA W/O US: CPT | Mod: RT

## 2025-01-13 PROCEDURE — 99214 OFFICE O/P EST MOD 30 MIN: CPT | Mod: 25

## 2025-01-17 ENCOUNTER — TRANSCRIPTION ENCOUNTER (OUTPATIENT)
Age: 25
End: 2025-01-17

## 2025-03-05 ENCOUNTER — APPOINTMENT (OUTPATIENT)
Dept: OTOLARYNGOLOGY | Facility: CLINIC | Age: 25
End: 2025-03-05
Payer: COMMERCIAL

## 2025-03-05 ENCOUNTER — NON-APPOINTMENT (OUTPATIENT)
Age: 25
End: 2025-03-05

## 2025-03-05 VITALS — BODY MASS INDEX: 22.49 KG/M2 | HEIGHT: 65 IN | WEIGHT: 135 LBS

## 2025-03-05 DIAGNOSIS — H60.8X3 OTHER OTITIS EXTERNA, BILATERAL: ICD-10-CM

## 2025-03-05 DIAGNOSIS — H61.23 IMPACTED CERUMEN, BILATERAL: ICD-10-CM

## 2025-03-05 PROCEDURE — 99213 OFFICE O/P EST LOW 20 MIN: CPT | Mod: 25

## 2025-03-05 PROCEDURE — 69210 REMOVE IMPACTED EAR WAX UNI: CPT

## 2025-06-02 ENCOUNTER — APPOINTMENT (OUTPATIENT)
Dept: OTOLARYNGOLOGY | Facility: CLINIC | Age: 25
End: 2025-06-02
Payer: COMMERCIAL

## 2025-06-02 DIAGNOSIS — H60.8X3 OTHER OTITIS EXTERNA, BILATERAL: ICD-10-CM

## 2025-06-02 PROCEDURE — 99213 OFFICE O/P EST LOW 20 MIN: CPT

## 2025-07-24 ENCOUNTER — NON-APPOINTMENT (OUTPATIENT)
Age: 25
End: 2025-07-24

## 2025-08-05 ENCOUNTER — TRANSCRIPTION ENCOUNTER (OUTPATIENT)
Age: 25
End: 2025-08-05

## 2025-08-13 ENCOUNTER — APPOINTMENT (OUTPATIENT)
Dept: ULTRASOUND IMAGING | Facility: HOSPITAL | Age: 25
End: 2025-08-13
Payer: COMMERCIAL

## 2025-08-13 ENCOUNTER — OUTPATIENT (OUTPATIENT)
Dept: OUTPATIENT SERVICES | Facility: HOSPITAL | Age: 25
LOS: 1 days | End: 2025-08-13
Payer: COMMERCIAL

## 2025-08-13 DIAGNOSIS — Z98.890 OTHER SPECIFIED POSTPROCEDURAL STATES: Chronic | ICD-10-CM

## 2025-08-13 PROCEDURE — 76830 TRANSVAGINAL US NON-OB: CPT

## 2025-08-13 PROCEDURE — 76830 TRANSVAGINAL US NON-OB: CPT | Mod: 26

## (undated) DEVICE — GLV 7 PROTEXIS (WHITE)

## (undated) DEVICE — SUT MONOCRYL 4-0 18" PS-2

## (undated) DEVICE — SUT VICRYL 2-0 27" SH

## (undated) DEVICE — ELCTR BOVIE TIP BLADE INSULATED 2.75" EDGE

## (undated) DEVICE — PACK GENERAL MINOR

## (undated) DEVICE — DRAPE TOWEL BLUE 17" X 24"

## (undated) DEVICE — SUT VICRYL 3-0 27" PS-2 UNDYED

## (undated) DEVICE — SLV COMPRESSION KNEE MED

## (undated) DEVICE — DRAPE LIGHT HANDLE COVER (GREEN)

## (undated) DEVICE — GLV 6.5 PROTEXIS (WHITE)

## (undated) DEVICE — ELCTR GROUNDING PAD ADULT COVIDIEN

## (undated) DEVICE — POSITIONER PILLOW WHITE 18X24" DISP